# Patient Record
Sex: FEMALE | Race: WHITE | Employment: OTHER | URBAN - METROPOLITAN AREA
[De-identification: names, ages, dates, MRNs, and addresses within clinical notes are randomized per-mention and may not be internally consistent; named-entity substitution may affect disease eponyms.]

---

## 2017-02-15 RX ORDER — METOPROLOL TARTRATE 25 MG/1
TABLET, FILM COATED ORAL
Qty: 180 TAB | Refills: 0 | Status: SHIPPED | OUTPATIENT
Start: 2017-02-15 | End: 2017-05-08

## 2017-02-17 ENCOUNTER — HOSPITAL ENCOUNTER (OUTPATIENT)
Dept: MAMMOGRAPHY | Age: 72
Discharge: HOME | End: 2017-02-17
Attending: FAMILY MEDICINE
Payer: MEDICARE

## 2017-02-17 DIAGNOSIS — Z12.31 VISIT FOR SCREENING MAMMOGRAM: ICD-10-CM

## 2017-02-17 PROCEDURE — 77063 BREAST TOMOSYNTHESIS BI: CPT

## 2017-03-20 ENCOUNTER — TELEPHONE (OUTPATIENT)
Dept: CARDIOLOGY CLINIC | Age: 72
End: 2017-03-20

## 2017-03-20 RX ORDER — ASPIRIN 325 MG
TABLET, DELAYED RELEASE (ENTERIC COATED) ORAL
Qty: 90 TAB | Refills: 3 | Status: SHIPPED | OUTPATIENT
Start: 2017-03-20 | End: 2018-03-12 | Stop reason: SDUPTHER

## 2017-03-20 NOTE — TELEPHONE ENCOUNTER
Left message for patient to call to schedule overdue follow up and also let patient know that her refill has been sent to the pharmacy.

## 2017-04-18 ENCOUNTER — OFFICE VISIT (OUTPATIENT)
Dept: CARDIOLOGY CLINIC | Age: 72
End: 2017-04-18

## 2017-04-18 VITALS
SYSTOLIC BLOOD PRESSURE: 126 MMHG | HEART RATE: 89 BPM | BODY MASS INDEX: 59.07 KG/M2 | WEIGHT: 293 LBS | DIASTOLIC BLOOD PRESSURE: 69 MMHG | OXYGEN SATURATION: 98 % | HEIGHT: 59 IN

## 2017-04-18 DIAGNOSIS — I48.0 PAROXYSMAL ATRIAL FIBRILLATION (HCC): Primary | ICD-10-CM

## 2017-04-18 NOTE — PROGRESS NOTES
[facility]       Ms. Hershal Gottron is a 70year old woman with diabetes, hypertension and dyslipidemia. She has paroxysmal atrial fibrillation as noted on Holter monitor in July of 2014. Ms. Hershal Gottron is here today for follow up appointment. She used to be an established patient of Dr. Fritz. She is here today to establish care with me. She denies any new symptoms since last time. She is denying any awareness of palpitations. She denies any chest pain or chest tightness. She does have significant degenerative joint disease of the hip and spine and she has a lot of back pain and knee pain from that. Other than that she denies any specific PND or orthopnea. Past Medical History:   Diagnosis Date    Anxiety     Arthritis     Atrial fibrillation (HCC)     CHADS score 2  (-CHF, +HTN, -AGE, +DM, -CVA)    COPD     Degenerative spine disease     Diabetes ([address]     Dyslipidemia     Hypertension     Major depression     Menopause     Morbid [facility] ([facility]     Neuropathic pain     Sleep apnea        Past Surgical History:   Procedure Laterality Date    HX ABDOMINOPLASTY      HX HYSTERECTOMY         Current Outpatient Prescriptions   Medication Sig    aspirin delayed-release 325 mg tablet TAKE 1 TABLET BY MOUTH DAILY    metoprolol tartrate (LOPRESSOR) 25 mg tablet TAKE 1 TABLET BY MOUTH TWICE DAILY    SYMBICORT 160-4.5 mcg/actuation HFA inhaler     trospium (SANCTURA XL) 60 mg capsule Take 60 mg by mouth Daily (before breakfast).  fluticasone (FLOVENT DISKUS) 50 mcg/actuation inhaler Take  by inhalation.  nystatin-triamcinolone (MYCOLOG II) topical cream Apply  to affected area two (2) times a day.  albuterol (PROAIR HFA) 90 mcg/actuation inhaler Take  by inhalation.  Omega-3-DHA-EPA-Fish Oil 1,000 (120-180) mg cap Take  by mouth.  omeprazole (PRILOSEC) 20 mg capsule Take 20 mg by mouth daily.  losartan (COZAAR) 50 mg tablet Take 50 mg by mouth daily.     buPROPion SR (WELLBUTRIN, ZYBAN) 200 mg SR tablet Take 200 mg by mouth daily.  ergocalciferol (VITAMIN D2) 50,000 unit capsule Take 50,000 Units by mouth every seven (7) days. EVERY 2 WEEKS    sertraline (ZOLOFT) 100 mg tablet Take 100 mg by mouth two (2) times a day.  NIFEdipine ER (PROCARDIA XL) 60 mg ER tablet Take 60 mg by mouth daily.  tolterodine ER (DETROL LA) 4 mg ER capsule Take 4 mg by mouth daily. No current facility-administered medications for this visit. Allergies   Allergen Reactions    Sulfa (Sulfonamide Antibiotics) Hives       Family History:   Non contributory for premature coronary disease in first degree relatives. Social History:   She  reports that she has never smoked. She does not have any smokeless tobacco history on file. She  reports that she does not drink alcohol. Physical:   Vitals:   BP: 126/69  HR: 89  Wt: 295 lb (133.8 kg)    Exam:   GENERAL:  Ms. Court Woods is an older woman without complaints. CHEST:  Clear with good effort. CARDIAC:  Regular  ABDOMEN:  Soft. Bowel sounds present. EXTREMITIES:  No edema. Scattered ecchymoses. CAROTID:  No bruit.   NECK:  No JVD    Review of Data:   LABS:   Lab Results   Component Value Date/Time    WBC 13.5 06/16/2014 03:40 PM    HGB 12.9 06/16/2014 03:40 PM    HCT 40.1 06/16/2014 03:40 PM    PLATELET 337 12/28/5964 03:40 PM     Lab Results   Component Value Date/Time    Sodium 139 06/16/2014 03:40 PM    Potassium 4.7 06/16/2014 03:40 PM    Chloride 107 06/16/2014 03:40 PM    CO2 23 06/16/2014 03:40 PM    Glucose 133 06/16/2014 03:40 PM    BUN 24 06/16/2014 03:40 PM    Creatinine 1.12 06/16/2014 03:40 PM     Lab Results   Component Value Date/Time    Cholesterol, total 176 08/17/2010 02:30 PM    HDL Cholesterol 33 08/17/2010 02:30 PM    LDL, calculated 83.6 08/17/2010 02:30 PM    Triglyceride 297 08/17/2010 02:30 PM    CHOL/HDL Ratio 5.3 08/17/2010 02:30 PM     No results found for: GPT  No results found for: HBA1C, [id number]  EKG: July 2014:  double voltage standard. Sinus tachycardia 121 beats per minute. Nonspecific interventricular conduction delay. Nonspecific ST-T wave changes. HOLTER: July 2014:  Rhythm was sinus with intermittent short run of atrial fibrillation. Atrial fibrillation was present for 0.31% of the recording time. The average heart rate, excluding ectopy, was 77 beats per minute, with a minimum heart rate of 55 beats per minute at 8:42 a.m., and a peak heart rate of 126 beats per minute at 5:59 p.m. Heartbeats, including ectopy, totalled 183,947 beats. Ventricular ectopy:  None identified. Supraventricular ectopy:  250 total, averaging 6.5 per hour, with 216 single events and 34 paired beats. Interpretation:  1. Rhythm is sinus with intermittent brief run of atrial fibrillation. Patient was in atrial fibrillation for 0.31% of the recording time. 2. AK and QRS are within normal limits. 3. 216 PACs, 34 paired events. ECHO: July 2014:  LEFT VENTRICLE: Size was normal. Systolic function was at the lower limits of normal by visual assessment. Ejection fraction was estimated in the range of 50-55%. No obvious wall motion abnormalities identified in the views obtained. Wall thickness was mildly increased. DOPPLER: Doppler parameters were consistent with abnormal left ventricular relaxation (grade 1 diastolic dysfunction). RIGHT VENTRICLE: The size was normal. Systolic function was normal. Wall thickness was normal. DOPPLER: The tricuspid jet envelope definition was inadequate for estimation of RV systolic pressure. There are no indirect findings (abnormal RV volume or geometry, altered pulmonary flow velocity profile, or leftward septal displacement) which would suggest moderate or severe pulmonary hypertension. LEFT ATRIUM: Size was normal. LA volume index was 12 ml/m squared. RIGHT ATRIUM: Size was normal.    MITRAL VALVE: There was mild annular calcification.  There was minimal thickening. DOPPLER: There was no evidence for stenosis. There was mild regurgitation. AORTIC VALVE: The valve was trileaflet. Leaflets exhibited mildly increased thickness and mild calcification. DOPPLER: There was no stenosis. There was no significant regurgitation. TRICUSPID VALVE: There was normal leaflet separation. DOPPLER: There was no evidence for tricuspid stenosis. There was trivial regurgitation. PULMONIC VALVE: Not well visualized, but normal Doppler findings. AORTA: The root exhibited normal size. PERICARDIUM: No significant pericardial effusion identified. A pericardial fat pad was present. Impression / Plan:     Diabetes    Hypertension    Dyslipidemia    Atrial fibrillation     Paroxysmal atrial fibrillation: This diagnosis was made on a Holter monitor performed in July, 2014. All electrocardiograms since then have shown sinus rhythm. On exam she appears to be in sinus rhythm. Historically she denies any awareness of any palpitations or any arrhythmia. She is on AV rima blocking agent for rate control. She is also on Metoprolol 25 mg twice daily. She is on aspirin for stroke prophylaxis. We had a lengthy discussion today regarding her CHADS2 score, as well as CHADS2-vasc score. Risk of stroke based on her CHADS score was discussed. After a lengthy discussion she has decided to continue with aspirin only. She'd like to think about anticoagulation with Coumadin or newer agent and she would let us know in the future. For now she'd like to continue only with aspirin despite knowing her CHADS score and risk of stroke. Hypertension:  Her blood pressure today is 126/69 mmHg. She's on Lopressor, Losartan and Nifedipine, which I recommend to continue. Diabetes: This is being managed by primary care provider. She is not on any oral agent at this time. Goal hemoglobin A1c less than 7 is recommended from cardiovascular standpoint.   This is being managed by  [facility].    Hyperlipidemia:  Currently she is taking omega 3 fatty acid. She is not on any statin medication. This is being managed by primary care provider. I would recommend to continue with same medication for now. Other than the above, or unless any additional issues arise, I have asked that she follow up in 9-12 months.

## 2017-04-18 NOTE — LETTER
Patient:  Amarjit Morgan YOB: 1945 Date of Visit: 4/18/2017 Dear Duy Sanches MD 
[id number] [facility] [id number] VIA Facsimile: [phone] 
 : 
 
 
     [facility] Ms. Everardo Hopkins is a 70year old woman with diabetes, hypertension and dyslipidemia. She has paroxysmal atrial fibrillation as noted on Holter monitor in July of 2014. Ms. Everardo Hopkins is here today for follow up appointment. She used to be an established patient of Dr. Kim Wright. She is here today to establish care with me. She denies any new symptoms since last time. She is denying any awareness of palpitations. She denies any chest pain or chest tightness. She does have significant degenerative joint disease of the hip and spine and she has a lot of back pain and knee pain from that. Other than that she denies any specific PND or orthopnea. Past Medical History:  
Diagnosis Date  Anxiety  Arthritis  Atrial fibrillation ([address] CHADS score 2  (-CHF, +HTN, -AGE, +DM, -CVA)  COPD  Degenerative spine disease  Diabetes ([address]  Dyslipidemia  Hypertension  Major depression  Menopause  Morbid obesity ([address]  Neuropathic pain  Sleep apnea Past Surgical History:  
Procedure Laterality Date  HX ABDOMINOPLASTY  HX HYSTERECTOMY Current Outpatient Prescriptions Medication Sig  
 aspirin delayed-release 325 mg tablet TAKE 1 TABLET BY MOUTH DAILY  metoprolol tartrate (LOPRESSOR) 25 mg tablet TAKE 1 TABLET BY MOUTH TWICE DAILY  SYMBICORT 160-4.5 mcg/actuation HFA inhaler  trospium (SANCTURA XL) 60 mg capsule Take 60 mg by mouth Daily (before breakfast).  fluticasone (FLOVENT DISKUS) 50 mcg/actuation inhaler Take  by inhalation.  nystatin-triamcinolone (MYCOLOG II) topical cream Apply  to affected area two (2) times a day.  albuterol (PROAIR HFA) 90 mcg/actuation inhaler Take  by inhalation.  Omega-3-DHA-EPA-Fish Oil 1,000 (120-180) mg cap Take  by mouth.  omeprazole (PRILOSEC) 20 mg capsule Take 20 mg by mouth daily.  losartan (COZAAR) 50 mg tablet Take 50 mg by mouth daily.  buPROPion SR (WELLBUTRIN, ZYBAN) 200 mg SR tablet Take 200 mg by mouth daily.  ergocalciferol (VITAMIN D2) 50,000 unit capsule Take 50,000 Units by mouth every seven (7) days. EVERY 2 WEEKS  sertraline (ZOLOFT) 100 mg tablet Take 100 mg by mouth two (2) times a day.  NIFEdipine ER (PROCARDIA XL) 60 mg ER tablet Take 60 mg by mouth daily.  tolterodine ER (DETROL LA) 4 mg ER capsule Take 4 mg by mouth daily. No current facility-administered medications for this visit. Allergies Allergen Reactions  Sulfa (Sulfonamide Antibiotics) Hives Family History:  
Non contributory for premature coronary disease in first degree relatives. Social History: She  reports that she has never smoked. She does not have any smokeless tobacco history on file. She  reports that she does not drink alcohol. Physical:  
Vitals:  
BP: 126/69 HR: 89 Wt: 295 lb (133.8 kg) Exam:  
GENERAL:  Ms. Yary Booth is an older woman without complaints. CHEST:  Clear with good effort. CARDIAC:  Regular ABDOMEN:  Soft. Bowel sounds present. EXTREMITIES:  No edema. Scattered ecchymoses. CAROTID:  No bruit. NECK:  No JVD Review of Data:  
LABS:  
Lab Results Component Value Date/Time WBC 13.5 06/16/2014 03:40 PM  
 HGB 12.9 06/16/2014 03:40 PM  
 HCT 40.1 06/16/2014 03:40 PM  
 PLATELET 330 25/92/2011 03:40 PM  
 
Lab Results Component Value Date/Time Sodium 139 06/16/2014 03:40 PM  
 Potassium 4.7 06/16/2014 03:40 PM  
 Chloride 107 06/16/2014 03:40 PM  
 CO2 23 06/16/2014 03:40 PM  
 Glucose 133 06/16/2014 03:40 PM  
 BUN 24 06/16/2014 03:40 PM  
 Creatinine 1.12 06/16/2014 03:40 PM  
 
Lab Results Component Value Date/Time  Cholesterol, total 176 08/17/2010 02:30 PM  
 HDL Cholesterol 33 08/17/2010 02:30 PM  
 LDL, calculated 83.6 08/17/2010 02:30 PM  
 Triglyceride 297 08/17/2010 02:30 PM  
 CHOL/HDL Ratio 5.3 08/17/2010 02:30 PM  
 
No results found for: GPT No results found for: HBA1C, [id number] 
EKG: July 2014: 
double voltage standard. Sinus tachycardia 121 beats per minute. Nonspecific interventricular conduction delay. Nonspecific ST-T wave changes. HOLTER: July 2014: 
Rhythm was sinus with intermittent short run of atrial fibrillation. Atrial fibrillation was present for 0.31% of the recording time. The average heart rate, excluding ectopy, was 77 beats per minute, with a minimum heart rate of 55 beats per minute at 8:42 a.m., and a peak heart rate of 126 beats per minute at 5:59 p.m. Heartbeats, including ectopy, totalled 183,947 beats. Ventricular ectopy:  None identified. Supraventricular ectopy:  250 total, averaging 6.5 per hour, with 216 single events and 34 paired beats. Interpretation: 1. Rhythm is sinus with intermittent brief run of atrial fibrillation. Patient was in atrial fibrillation for 0.31% of the recording time. 2. SD and QRS are within normal limits. 3. 216 PACs, 34 paired events. ECHO: July 2014: 
LEFT VENTRICLE: Size was normal. Systolic function was at the lower limits of normal by visual assessment. Ejection fraction was estimated in the range of 50-55%. No obvious wall motion abnormalities identified in the views obtained. Wall thickness was mildly increased. DOPPLER: Doppler parameters were consistent with abnormal left ventricular relaxation (grade 1 diastolic dysfunction). RIGHT VENTRICLE: The size was normal. Systolic function was normal. Wall thickness was normal. DOPPLER: The tricuspid jet envelope definition was inadequate for estimation of RV systolic pressure.  There are no indirect findings (abnormal RV volume or geometry, altered pulmonary flow velocity profile, or leftward septal displacement) which would suggest moderate or severe pulmonary hypertension. LEFT ATRIUM: Size was normal. LA volume index was 12 ml/m squared. RIGHT ATRIUM: Size was normal. 
 
MITRAL VALVE: There was mild annular calcification. There was minimal thickening. DOPPLER: There was no evidence for stenosis. There was mild regurgitation. AORTIC VALVE: The valve was trileaflet. Leaflets exhibited mildly increased thickness and mild calcification. DOPPLER: There was no stenosis. There was no significant regurgitation. TRICUSPID VALVE: There was normal leaflet separation. DOPPLER: There was no evidence for tricuspid stenosis. There was trivial regurgitation. PULMONIC VALVE: Not well visualized, but normal Doppler findings. AORTA: The root exhibited normal size. PERICARDIUM: No significant pericardial effusion identified. A pericardial fat pad was present. Impression / Plan:  Diabetes  Hypertension  Dyslipidemia  Atrial fibrillation Paroxysmal atrial fibrillation: This diagnosis was made on a Holter monitor performed in July, 2014. All electrocardiograms since then have shown sinus rhythm. On exam she appears to be in sinus rhythm. Historically she denies any awareness of any palpitations or any arrhythmia. She is on AV rima blocking agent for rate control. She is also on Metoprolol 25 mg twice daily. She is on aspirin for stroke prophylaxis. We had a lengthy discussion today regarding her CHADS2 score, as well as CHADS2-vasc score. Risk of stroke based on her CHADS score was discussed. After a lengthy discussion she has decided to continue with aspirin only. She'd like to think about anticoagulation with Coumadin or newer agent and she would let us know in the future. For now she'd like to continue only with aspirin despite knowing her CHADS score and risk of stroke. Hypertension:  Her blood pressure today is 126/69 mmHg.   She's on Lopressor, Losartan and Nifedipine, which I recommend to continue. Diabetes: This is being managed by primary care provider. She is not on any oral agent at this time. Goal hemoglobin A1c less than 7 is recommended from cardiovascular standpoint. This is being managed by Dr. Estiven Oakes. Hyperlipidemia:  Currently she is taking omega 3 fatty acid. She is not on any statin medication. This is being managed by primary care provider. I would recommend to continue with same medication for now. Other than the above, or unless any additional issues arise, I have asked that she follow up in 9-12 months. Sincerely, Aquiles Guy MD

## 2017-04-18 NOTE — MR AVS SNAPSHOT
Visit Information Date & Time Provider Department Dept. Phone Encounter #  
 4/18/2017  9:15 AM Oxana Roy MD [address] [place] [address] Specialist at [facility] [phone] [id number] Follow-up Instructions Return in about 1 year (around 4/18/2018). Upcoming Health Maintenance Date Due HEMOGLOBIN A1C Q6M 1945 FOOT EXAM Q1 11/1/1954 MICROALBUMIN Q1 11/1/1954 EYE EXAM RETINAL OR DILATED Q1 11/1/1954 DTaP/Tdap/Td series (1 - Tdap) 11/1/1965 FOBT Q 1 YEAR AGE 50-75 11/1/1994 ZOSTER VACCINE AGE 60> 11/1/2004 GLAUCOMA SCREENING Q2Y 11/1/2009 Pneumococcal 65+ Low/Medium Risk (1 of 2 - PCV13) 11/1/2009 MEDICARE YEARLY EXAM 11/1/2009 LIPID PANEL Q1 8/17/2011 INFLUENZA AGE 9 TO ADULT 8/1/2016 BREAST CANCER SCRN MAMMOGRAM 2/17/2019 Allergies as of 4/18/2017  Review Complete On: 2/17/2017 By: Sheridan Spicer Severity Noted Reaction Type Reactions Sulfa (Sulfonamide Antibiotics)  05/14/2013    Hives Current Immunizations  Never Reviewed No immunizations on file. Not reviewed this visit You Were Diagnosed With   
  
 Codes Comments Paroxysmal atrial fibrillation (HCC)    -  Primary ICD-10-CM: I48.0 ICD-9-CM: 427.31 Vitals BP Pulse Height(growth percentile) Weight(growth percentile) SpO2 BMI  
 126/69 89 4' 11\" (1.499 m) 295 lb (133.8 kg) 98% 59.58 kg/m2 OB Status Smoking Status Hysterectomy Never Smoker BMI and BSA Data Body Mass Index Body Surface Area 59.58 kg/m 2 2.36 m 2 Preferred Pharmacy Pharmacy Name Phone [facility] [place], [address] [place] [address] [phone] Your Updated Medication List  
  
   
This list is accurate as of: 4/18/17  9:46 AM.  Always use your most recent med list.  
  
  
  
  
 aspirin delayed-release 325 mg tablet TAKE 1 TABLET BY MOUTH DAILY buPROPion  mg SR tablet Commonly known as:  Ivan Gola Take 200 mg by mouth daily. fluticasone 50 mcg/actuation inhaler Commonly known as:  FLOVENT DISKUS Take  by inhalation. losartan 50 mg tablet Commonly known as:  COZAAR Take 50 mg by mouth daily. metoprolol tartrate 25 mg tablet Commonly known as:  LOPRESSOR  
TAKE 1 TABLET BY MOUTH TWICE DAILY  
  
 NIFEdipine ER 60 mg ER tablet Commonly known as:  PROCARDIA XL Take 60 mg by mouth daily. nystatin-triamcinolone topical cream  
Commonly known as:  MYCOLOG II Apply  to affected area two (2) times a day. Omega-3-DHA-EPA-Fish Oil 1,000 mg (120 mg-180 mg) Cap Take  by mouth. omeprazole 20 mg capsule Commonly known as:  PRILOSEC Take 20 mg by mouth daily. PROAIR HFA 90 mcg/actuation inhaler Generic drug:  albuterol Take  by inhalation. sertraline 100 mg tablet Commonly known as:  ZOLOFT Take 100 mg by mouth two (2) times a day. SYMBICORT 160-4.5 mcg/actuation HFA inhaler Generic drug:  budesonide-formoterol  
  
 tolterodine ER 4 mg ER capsule Commonly known as:  Lisset Gunnar Take 4 mg by mouth daily. trospium 60 mg capsule Commonly known as:  SANCTURA XL Take 60 mg by mouth Daily (before breakfast). VITAMIN D2 50,000 unit capsule Generic drug:  ergocalciferol Take 50,000 Units by mouth every seven (7) days. EVERY 2 WEEKS We Performed the Following AMB POC EKG ROUTINE W/ 12 LEADS, INTER & REP [55400 CPT(R)] Follow-up Instructions Return in about 1 year (around 4/18/2018). Introducing [facility]! Dear Gadiel Walsh: Thank you for requesting a [employer] account. Our records indicate that you have previously registered for a [employer] account but its currently inactive. Please call our [employer] support line at [phone]. Additional Information If you have questions, please visit the Frequently Asked Questions section of the [facility] website at [email or web address] Remember, [facility] is NOT to be used for urgent needs. For medical emergencies, dial 911. Now available from your iPhone and Android! Please provide this summary of care documentation to your next provider. Your primary care clinician is listed as Dillon Hernandez. If you have any questions after today's visit, please call [phone].

## 2017-04-28 NOTE — COMMUNICATION BODY
[facility]       Ms. Alethea Gallardo is a 70year old woman with diabetes, hypertension and dyslipidemia. She has paroxysmal atrial fibrillation as noted on Holter monitor in July of 2014. Ms. Alethea Gallardo is here today for follow up appointment. She used to be an established patient of Dr. Shefali Beaver. She is here today to establish care with me. She denies any new symptoms since last time. She is denying any awareness of palpitations. She denies any chest pain or chest tightness. She does have significant degenerative joint disease of the hip and spine and she has a lot of back pain and knee pain from that. Other than that she denies any specific PND or orthopnea. Past Medical History:   Diagnosis Date    Anxiety     Arthritis     Atrial fibrillation (HCC)     CHADS score 2  (-CHF, +HTN, -AGE, +DM, -CVA)    COPD     Degenerative spine disease     Diabetes ([facility] [address]     Dyslipidemia     Hypertension     Major depression     Menopause     Morbid obesity ([facility] [address]     Neuropathic pain     Sleep apnea        Past Surgical History:   Procedure Laterality Date    HX ABDOMINOPLASTY      HX HYSTERECTOMY         Current Outpatient Prescriptions   Medication Sig    aspirin delayed-release 325 mg tablet TAKE 1 TABLET BY MOUTH DAILY    metoprolol tartrate (LOPRESSOR) 25 mg tablet TAKE 1 TABLET BY MOUTH TWICE DAILY    SYMBICORT 160-4.5 mcg/actuation HFA inhaler     trospium (SANCTURA XL) 60 mg capsule Take 60 mg by mouth Daily (before breakfast).  fluticasone (FLOVENT DISKUS) 50 mcg/actuation inhaler Take  by inhalation.  nystatin-triamcinolone (MYCOLOG II) topical cream Apply  to affected area two (2) times a day.  albuterol (PROAIR HFA) 90 mcg/actuation inhaler Take  by inhalation.  Omega-3-DHA-EPA-Fish Oil 1,000 (120-180) mg cap Take  by mouth.  omeprazole (PRILOSEC) 20 mg capsule Take 20 mg by mouth daily.  losartan (COZAAR) 50 mg tablet Take 50 mg by mouth daily.     buPROPion SR (WELLBUTRIN, ZYBAN) 200 mg SR tablet Take 200 mg by mouth daily.  ergocalciferol (VITAMIN D2) 50,000 unit capsule Take 50,000 Units by mouth every seven (7) days. EVERY 2 WEEKS    sertraline (ZOLOFT) 100 mg tablet Take 100 mg by mouth two (2) times a day.  NIFEdipine ER (PROCARDIA XL) 60 mg ER tablet Take 60 mg by mouth daily.  tolterodine ER (DETROL LA) 4 mg ER capsule Take 4 mg by mouth daily. No current facility-administered medications for this visit. Allergies   Allergen Reactions    Sulfa (Sulfonamide Antibiotics) Hives       Family History:   Non contributory for premature coronary disease in first degree relatives. Social History:   She  reports that she has never smoked. She does not have any smokeless tobacco history on file. She  reports that she does not drink alcohol. Physical:   Vitals:   BP: 126/69  HR: 89  Wt: 295 lb (133.8 kg)    Exam:   GENERAL:  Ms. Everardo Hopkins is an older woman without complaints. CHEST:  Clear with good effort. CARDIAC:  Regular  ABDOMEN:  Soft. Bowel sounds present. EXTREMITIES:  No edema. Scattered ecchymoses. CAROTID:  No bruit.   NECK:  No JVD    Review of Data:   LABS:   Lab Results   Component Value Date/Time    WBC 13.5 06/16/2014 03:40 PM    HGB 12.9 06/16/2014 03:40 PM    HCT 40.1 06/16/2014 03:40 PM    PLATELET 836 96/41/9367 03:40 PM     Lab Results   Component Value Date/Time    Sodium 139 06/16/2014 03:40 PM    Potassium 4.7 06/16/2014 03:40 PM    Chloride 107 06/16/2014 03:40 PM    CO2 23 06/16/2014 03:40 PM    Glucose 133 06/16/2014 03:40 PM    BUN 24 06/16/2014 03:40 PM    Creatinine 1.12 06/16/2014 03:40 PM     Lab Results   Component Value Date/Time    Cholesterol, total 176 08/17/2010 02:30 PM    HDL Cholesterol 33 08/17/2010 02:30 PM    LDL, calculated 83.6 08/17/2010 02:30 PM    Triglyceride 297 08/17/2010 02:30 PM    CHOL/HDL Ratio 5.3 08/17/2010 02:30 PM     No results found for: GPT  No results found for: HBA1C, [id number]  EKG: July 2014:  double voltage standard. Sinus tachycardia 121 beats per minute. Nonspecific interventricular conduction delay. Nonspecific ST-T wave changes. HOLTER: July 2014:  Rhythm was sinus with intermittent short run of atrial fibrillation. Atrial fibrillation was present for 0.31% of the recording time. The average heart rate, excluding ectopy, was 77 beats per minute, with a minimum heart rate of 55 beats per minute at 8:42 a.m., and a peak heart rate of 126 beats per minute at 5:59 p.m. Heartbeats, including ectopy, totalled 183,947 beats. Ventricular ectopy:  None identified. Supraventricular ectopy:  250 total, averaging 6.5 per hour, with 216 single events and 34 paired beats. Interpretation:  1. Rhythm is sinus with intermittent brief run of atrial fibrillation. Patient was in atrial fibrillation for 0.31% of the recording time. 2. SC and QRS are within normal limits. 3. 216 PACs, 34 paired events. ECHO: July 2014:  LEFT VENTRICLE: Size was normal. Systolic function was at the lower limits of normal by visual assessment. Ejection fraction was estimated in the range of 50-55%. No obvious wall motion abnormalities identified in the views obtained. Wall thickness was mildly increased. DOPPLER: Doppler parameters were consistent with abnormal left ventricular relaxation (grade 1 diastolic dysfunction). RIGHT VENTRICLE: The size was normal. Systolic function was normal. Wall thickness was normal. DOPPLER: The tricuspid jet envelope definition was inadequate for estimation of RV systolic pressure. There are no indirect findings (abnormal RV volume or geometry, altered pulmonary flow velocity profile, or leftward septal displacement) which would suggest moderate or severe pulmonary hypertension. LEFT ATRIUM: Size was normal. LA volume index was 12 ml/m squared. RIGHT ATRIUM: Size was normal.    MITRAL VALVE: There was mild annular calcification.  There was minimal thickening. DOPPLER: There was no evidence for stenosis. There was mild regurgitation. AORTIC VALVE: The valve was trileaflet. Leaflets exhibited mildly increased thickness and mild calcification. DOPPLER: There was no stenosis. There was no significant regurgitation. TRICUSPID VALVE: There was normal leaflet separation. DOPPLER: There was no evidence for tricuspid stenosis. There was trivial regurgitation. PULMONIC VALVE: Not well visualized, but normal Doppler findings. AORTA: The root exhibited normal size. PERICARDIUM: No significant pericardial effusion identified. A pericardial fat pad was present. Impression / Plan:     Diabetes    Hypertension    Dyslipidemia    Atrial fibrillation     Paroxysmal atrial fibrillation: This diagnosis was made on a Holter monitor performed in July, 2014. All electrocardiograms since then have shown sinus rhythm. On exam she appears to be in sinus rhythm. Historically she denies any awareness of any palpitations or any arrhythmia. She is on AV rima blocking agent for rate control. She is also on Metoprolol 25 mg twice daily. She is on aspirin for stroke prophylaxis. We had a lengthy discussion today regarding her CHADS2 score, as well as CHADS2-vasc score. Risk of stroke based on her CHADS score was discussed. After a lengthy discussion she has decided to continue with aspirin only. She'd like to think about anticoagulation with Coumadin or newer agent and she would let us know in the future. For now she'd like to continue only with aspirin despite knowing her CHADS score and risk of stroke. Hypertension:  Her blood pressure today is 126/69 mmHg. She's on Lopressor, Losartan and Nifedipine, which I recommend to continue. Diabetes: This is being managed by primary care provider. She is not on any oral agent at this time. Goal hemoglobin A1c less than 7 is recommended from cardiovascular standpoint.   This is being managed by  [facility].    Hyperlipidemia:  Currently she is taking omega 3 fatty acid. She is not on any statin medication. This is being managed by primary care provider. I would recommend to continue with same medication for now. Other than the above, or unless any additional issues arise, I have asked that she follow up in 9-12 months.

## 2017-05-08 ENCOUNTER — APPOINTMENT (OUTPATIENT)
Dept: GENERAL RADIOLOGY | Age: 72
End: 2017-05-08
Attending: EMERGENCY MEDICINE
Payer: MEDICARE

## 2017-05-08 ENCOUNTER — HOSPITAL ENCOUNTER (EMERGENCY)
Age: 72
Discharge: HOME | End: 2017-05-08
Attending: EMERGENCY MEDICINE | Admitting: EMERGENCY MEDICINE
Payer: MEDICARE

## 2017-05-08 VITALS
HEART RATE: 80 BPM | WEIGHT: 282 LBS | HEIGHT: 59 IN | OXYGEN SATURATION: 96 % | RESPIRATION RATE: 19 BRPM | SYSTOLIC BLOOD PRESSURE: 147 MMHG | DIASTOLIC BLOOD PRESSURE: 71 MMHG | TEMPERATURE: 99.5 F | BODY MASS INDEX: 56.85 KG/M2

## 2017-05-08 DIAGNOSIS — R55 NEAR SYNCOPE: Primary | ICD-10-CM

## 2017-05-08 DIAGNOSIS — R00.1 BRADYCARDIA: ICD-10-CM

## 2017-05-08 LAB
ALBUMIN SERPL BCP-MCNC: 3.5 G/DL (ref 3.4–5)
ALBUMIN/GLOB SERPL: 1.3 {RATIO} (ref 0.8–1.7)
ALP SERPL-CCNC: 50 U/L (ref 45–117)
ALT SERPL-CCNC: 32 U/L (ref 13–56)
ANION GAP BLD CALC-SCNC: 12 MMOL/L (ref 3–18)
AST SERPL W P-5'-P-CCNC: 43 U/L (ref 15–37)
BASOPHILS # BLD AUTO: 0 K/UL (ref 0–0.06)
BASOPHILS # BLD: 1 % (ref 0–2)
BILIRUB SERPL-MCNC: 0.4 MG/DL (ref 0.2–1)
BUN SERPL-MCNC: 24 MG/DL (ref 7–18)
BUN/CREAT SERPL: 26 (ref 12–20)
CALCIUM SERPL-MCNC: 9.3 MG/DL (ref 8.5–10.1)
CHLORIDE SERPL-SCNC: 104 MMOL/L (ref 100–108)
CK MB CFR SERPL CALC: 3.1 % (ref 0–4)
CK MB SERPL-MCNC: 1.1 NG/ML (ref 5–25)
CK SERPL-CCNC: 35 U/L (ref 26–192)
CO2 SERPL-SCNC: 25 MMOL/L (ref 21–32)
CREAT SERPL-MCNC: 0.91 MG/DL (ref 0.6–1.3)
DIFFERENTIAL METHOD BLD: ABNORMAL
EOSINOPHIL # BLD: 0.3 K/UL (ref 0–0.4)
EOSINOPHIL NFR BLD: 5 % (ref 0–5)
ERYTHROCYTE [DISTWIDTH] IN BLOOD BY AUTOMATED COUNT: 15.6 % (ref 11.6–14.5)
GLOBULIN SER CALC-MCNC: 2.6 G/DL (ref 2–4)
GLUCOSE SERPL-MCNC: 118 MG/DL (ref 74–99)
HCT VFR BLD AUTO: 38.3 % (ref 35–45)
HGB BLD-MCNC: 12.3 G/DL (ref 12–16)
LYMPHOCYTES # BLD AUTO: 27 % (ref 21–52)
LYMPHOCYTES # BLD: 1.8 K/UL (ref 0.9–3.6)
MCH RBC QN AUTO: 30.9 PG (ref 24–34)
MCHC RBC AUTO-ENTMCNC: 32.1 G/DL (ref 31–37)
MCV RBC AUTO: 96.2 FL (ref 74–97)
MONOCYTES # BLD: 0.4 K/UL (ref 0.05–1.2)
MONOCYTES NFR BLD AUTO: 6 % (ref 3–10)
NEUTS SEG # BLD: 4.2 K/UL (ref 1.8–8)
NEUTS SEG NFR BLD AUTO: 61 % (ref 40–73)
PLATELET # BLD AUTO: 121 K/UL (ref 135–420)
PMV BLD AUTO: 9.8 FL (ref 9.2–11.8)
POTASSIUM SERPL-SCNC: 4.5 MMOL/L (ref 3.5–5.5)
PROT SERPL-MCNC: 6.1 G/DL (ref 6.4–8.2)
RBC # BLD AUTO: 3.98 M/UL (ref 4.2–5.3)
SODIUM SERPL-SCNC: 141 MMOL/L (ref 136–145)
TROPONIN I BLD-MCNC: <0.04 NG/ML (ref 0–0.08)
TROPONIN I SERPL-MCNC: <0.02 NG/ML (ref 0–0.04)
WBC # BLD AUTO: 6.8 K/UL (ref 4.6–13.2)

## 2017-05-08 PROCEDURE — 71010 XR CHEST PORT: CPT

## 2017-05-08 PROCEDURE — 85025 COMPLETE CBC W/AUTO DIFF WBC: CPT | Performed by: EMERGENCY MEDICINE

## 2017-05-08 PROCEDURE — 82550 ASSAY OF CK (CPK): CPT | Performed by: EMERGENCY MEDICINE

## 2017-05-08 PROCEDURE — 80053 COMPREHEN METABOLIC PANEL: CPT | Performed by: EMERGENCY MEDICINE

## 2017-05-08 PROCEDURE — 99285 EMERGENCY DEPT VISIT HI MDM: CPT

## 2017-05-08 PROCEDURE — 93005 ELECTROCARDIOGRAM TRACING: CPT

## 2017-05-08 PROCEDURE — 84484 ASSAY OF TROPONIN QUANT: CPT | Performed by: EMERGENCY MEDICINE

## 2017-05-08 NOTE — ED NOTES
Purposeful rounding completed:    Side rails up x 1:  YES   Bed low and wheels and locked: YES   Call bell in reach: YES   Comfort addressed: YES     Toileting needs addressed: YES   Plan of care reviewed/updated with patient and or family members: YES   IV site assessed: YES  Pain assessed and addressed: Monique Ramos

## 2017-05-08 NOTE — ED NOTES
Purposeful rounding completed:    Side rails up x 1:  YES   Bed low and wheels and locked: YES   Call bell in reach: YES   Comfort addressed: YES     Toileting needs addressed: YES   Plan of care reviewed/updated with patient and or family members: YES   IV site assessed: YES  Pain assessed and addressed: Alcus Fabry

## 2017-05-08 NOTE — ED NOTES
Purposeful rounding completed:    Side rails up x 1:  YES   Bed low and wheels and locked: YES   Call bell in reach: YES   Comfort addressed: YES     Toileting needs addressed: YES   Plan of care reviewed/updated with patient and or family members: YES   IV site assessed: YES  Pain assessed and addressed: Judit Herndon

## 2017-05-08 NOTE — ED PROVIDER NOTES
HPI Comments: 1:38 PM Fransisco Barksdale is a 66 y.o. female with history of DM, COPD, and HTN who presents to the ED c/o SOB which begins with episodes of multiple symptoms. Pt complains of the episode containing associated bradycardia, light-headedness, dizziness, and arm \"fluttering. \" Pt states the first episode happened 3 years ago and has now happened a few times in the past 2-3 weeks. Pt states that she has taken aspirin last night. Pt states she has seen Dr. Kandice Linares and he states that she needed to be placed on a pacemaker or Coumadin, but has not been yet. Pt denies head trauma. Pt denies any syncope, or any other symptoms at this time. PCP: Jesse Cline MD      The history is provided by the patient. Past Medical History:   Diagnosis Date    Anxiety     Atrial fibrillation (HCC)     CHADS score 2  (-CHF, +HTN, -AGE, +DM, -CVA)    COPD     Degenerative spine disease     Diabetes ([address]     Dyslipidemia     Hypertension     Major depression     Morbid obesity ([facility] [address]     Neuropathic pain     Sleep apnea        Past Surgical History:   Procedure Laterality Date    HX ABDOMINOPLASTY      HX HYSTERECTOMY           History reviewed. No pertinent family history. Social History     Social History    Marital status:      Spouse name: N/A    Number of children: N/A    Years of education: N/A     Occupational History    Not on file. Social History Main Topics    Smoking status: Never Smoker    Smokeless tobacco: Not on file    Alcohol use No    Drug use: No    Sexual activity: Not on file     Other Topics Concern    Not on file     Social History Narrative         ALLERGIES: Sulfa (sulfonamide antibiotics)    Review of Systems   Constitutional: Negative for fever. HENT: Negative for congestion. Respiratory: Positive for shortness of breath. Negative for cough. Cardiovascular: Negative for chest pain and leg swelling.    Gastrointestinal: Negative for abdominal pain, nausea and vomiting. Genitourinary: Negative for dysuria. Musculoskeletal: Negative. Neurological: Positive for dizziness and light-headedness. Negative for speech difficulty and headaches. All other systems reviewed and are negative. Vitals:    05/08/17 1415 05/08/17 1430 05/08/17 1445 05/08/17 1500   BP: 119/87 (!) 142/101 134/66 143/66   Pulse: 78 79 73 80   Resp: 23 20 20 21   Temp:       SpO2: 95% 95% 94% 96%   Weight:       Height:                Physical Exam   Constitutional: She is oriented to person, place, and time. She appears well-developed and well-nourished. No distress. Obesity   HENT:   Head: Normocephalic and atraumatic. Mouth/Throat: Oropharynx is clear and moist.   Eyes: Conjunctivae and EOM are normal. Pupils are equal, round, and reactive to light. No scleral icterus. Neck: Normal range of motion. Neck supple. Cardiovascular: Normal rate, regular rhythm and normal heart sounds. No murmur heard. Pulmonary/Chest: Effort normal and breath sounds normal. No respiratory distress. Abdominal: Soft. Bowel sounds are normal. She exhibits no distension. There is no tenderness. Musculoskeletal: She exhibits no edema. Lymphadenopathy:     She has no cervical adenopathy. Neurological: She is alert and oriented to person, place, and time. Coordination normal.   Skin: Skin is warm and dry. No rash noted. Psychiatric: She has a normal mood and affect. Her behavior is normal.   Nursing note and vitals reviewed.        MDM  Number of Diagnoses or Management Options  Bradycardia:   Near syncope:   Diagnosis management comments: Near syncope at PCP's office with documented bradycardia now back to baseline h /o paroxysmal a fib on asa only per pt's choice with beta blocker    Ekg: in ED  nsr rate 79 no acute st changes QTc 454    cxr neg in ED     2 sets of negative cardiac enzymes in Ed will dc metoprolol and outpt follow up          Amount and/or Complexity of Data Reviewed  Clinical lab tests: ordered and reviewed  Tests in the radiology section of CPT®: ordered and reviewed      ED Course       Procedures  Vitals:  Patient Vitals for the past 12 hrs:   Temp Pulse Resp BP SpO2   05/08/17 1500 - 80 21 143/66 96 %   05/08/17 1445 - 73 20 134/66 94 %   05/08/17 1430 - 79 20 (!) 142/101 95 %   05/08/17 1415 - 78 23 119/87 95 %   05/08/17 1400 - 80 19 140/75 95 %   05/08/17 1345 - 80 22 139/82 95 %   05/08/17 1330 - 79 21 135/72 94 %   05/08/17 1319 99.5 °F (37.5 °C) 81 18 143/77 94 %   05/08/17 1315 - - - 143/77 94 %   05/08/17 1312 - - - 151/70 -        Medications ordered:   Medications - No data to display      Lab findings:  Recent Results (from the past 12 hour(s))   EKG, 12 LEAD, INITIAL    Collection Time: 05/08/17  1:32 PM   Result Value Ref Range    Ventricular Rate 79 BPM    Atrial Rate 79 BPM    P-R Interval 160 ms    QRS Duration 110 ms    Q-T Interval 396 ms    QTC Calculation (Bezet) 454 ms    Calculated P Axis 115 degrees    Calculated R Axis -40 degrees    Calculated T Axis 24 degrees    Diagnosis       Normal sinus rhythm  Left axis deviation  Pulmonary disease pattern  Abnormal ECG  When compared with ECG of 16-JUN-2014 16:15,  Vent. rate has increased BY  29 BPM     CBC WITH AUTOMATED DIFF    Collection Time: 05/08/17  3:40 PM   Result Value Ref Range    WBC 6.8 4.6 - 13.2 K/uL    RBC 3.98 (L) 4.20 - 5.30 M/uL    HGB 12.3 12.0 - 16.0 g/dL    HCT 38.3 35.0 - 45.0 %    MCV 96.2 74.0 - 97.0 FL    MCH 30.9 24.0 - 34.0 PG    MCHC 32.1 31.0 - 37.0 g/dL    RDW 15.6 (H) 11.6 - 14.5 %    PLATELET 044 (L) 358 - 420 K/uL    MPV 9.8 9.2 - 11.8 FL    NEUTROPHILS 61 40 - 73 %    LYMPHOCYTES 27 21 - 52 %    MONOCYTES 6 3 - 10 %    EOSINOPHILS 5 0 - 5 %    BASOPHILS 1 0 - 2 %    ABS. NEUTROPHILS 4.2 1.8 - 8.0 K/UL    ABS. LYMPHOCYTES 1.8 0.9 - 3.6 K/UL    ABS. MONOCYTES 0.4 0.05 - 1.2 K/UL    ABS. EOSINOPHILS 0.3 0.0 - 0.4 K/UL    ABS.  BASOPHILS 0.0 0.0 - 0.06 K/UL    DF AUTOMATED     METABOLIC PANEL, COMPREHENSIVE    Collection Time: 05/08/17  3:40 PM   Result Value Ref Range    Sodium 141 136 - 145 mmol/L    Potassium 4.5 3.5 - 5.5 mmol/L    Chloride 104 100 - 108 mmol/L    CO2 25 21 - 32 mmol/L    Anion gap 12 3.0 - 18 mmol/L    Glucose 118 (H) 74 - 99 mg/dL    BUN 24 (H) 7.0 - 18 MG/DL    Creatinine 0.91 0.6 - 1.3 MG/DL    BUN/Creatinine ratio 26 (H) 12 - 20      GFR est AA >60 >60 ml/min/1.73m2    GFR est non-AA >60 >60 ml/min/1.73m2    Calcium 9.3 8.5 - 10.1 MG/DL    Bilirubin, total 0.4 0.2 - 1.0 MG/DL    ALT (SGPT) 32 13 - 56 U/L    AST (SGOT) 43 (H) 15 - 37 U/L    Alk. phosphatase 50 45 - 117 U/L    Protein, total 6.1 (L) 6.4 - 8.2 g/dL    Albumin 3.5 3.4 - 5.0 g/dL    Globulin 2.6 2.0 - 4.0 g/dL    A-G Ratio 1.3 0.8 - 1.7     CARDIAC PANEL,(CK, CKMB & TROPONIN)    Collection Time: 05/08/17  3:40 PM   Result Value Ref Range    CK 35 26 - 192 U/L    CK - MB 1.1 <3.6 ng/ml    CK-MB Index 3.1 0.0 - 4.0 %    Troponin-I, Qt. <0.02 0.0 - 0.045 NG/ML   POC TROPONIN-I    Collection Time: 05/08/17  4:43 PM   Result Value Ref Range    Troponin-I (POC) <0.04 0.00 - 0.08 ng/mL       EKG interpretation by ED Physician:       X-Ray, CT or other radiology findings or impressions:  XR CHEST PORT   Final Result   IMPRESSION:     Left lung base is obscured, cannot exclude left basilar disease. Consider  follow-up PA and lateral chest x-ray when clinically feasible.     Per radiologist       Orders:  Orders Placed This Encounter    XR CHEST PORT     Standing Status:   Standing     Number of Occurrences:   1     Order Specific Question:   Reason for Exam     Answer:   near syncope    CBC WITH AUTOMATED DIFF     Standing Status:   Standing     Number of Occurrences:   1    METABOLIC PANEL, COMPREHENSIVE     Standing Status:   Standing     Number of Occurrences:   1    CARDIAC PANEL,(CK, CKMB & TROPONIN)     Standing Status:   Standing     Number of Occurrences:   1    POC TROPONIN-I Standing Status:   Standing     Number of Occurrences:   1    POC TROPONIN-I     Standing Status:   Standing     Number of Occurrences:   1    EKG, 12 LEAD, INITIAL     Standing Status:   Standing     Number of Occurrences:   1     Order Specific Question:   Reason for Exam:     Answer:   slow heart rate    IP CONSULT TO CARDIOLOGY     Standing Status:   Standing     Number of Occurrences:   1     Order Specific Question:   Reason for Consult: Answer:   bradycardia     Order Specific Question:   Did you call or speak to the consulting provider? Answer:   No     Order Specific Question:   Consult To     Answer:   Dr Jaymie Yusuf Specific Question:   Schedule When? Answer:   TODAY       Reevaluation, Progress notes, Consult notes, or additional Procedure notes:   2:11 PM Consult: I discussed care with Camila Melgar (Cardiology PA for Dr. Daniel Carvajal). It was a standard discussion including patient history, chief complaint, available diagnostic results, and predicted treatment course. She states she is going to consult with Dr. Daniel Carvajal and figure out the next steps to take. 2:13 PM Consult: I discussed care with Camila Melgar (Cardiology). It was a standard discussion including patient history, chief complaint, available diagnostic results, and predicted treatment course. She states to stop beta blockers and recommended follow up to discuss need of pacemaker. Disposition:  Diagnosis:   1. Near syncope    2.  Bradycardia        Disposition:     Follow-up Information     Follow up With Details Comments Contact [facility] EMERGENCY DEPT  As needed, If symptoms worsen [address] [place] [address]    Keila Howard MD Schedule an appointment as soon as possible for a visit for ED follow up appointment  [address]  [address] [place], [place] [address]  [facility]  [facility] [phone]             Patient's Medications   Start Taking    No medications on file   Continue Taking    ALBUTEROL (PROAIR HFA) 90 MCG/ACTUATION INHALER    Take  by inhalation. ASPIRIN DELAYED-RELEASE 325 MG TABLET    TAKE 1 TABLET BY MOUTH DAILY    BUPROPION SR (WELLBUTRIN, ZYBAN) 200 MG SR TABLET    Take 200 mg by mouth daily. ERGOCALCIFEROL (VITAMIN D2) 50,000 UNIT CAPSULE    Take 50,000 Units by mouth every seven (7) days. EVERY 2 WEEKS    FLUTICASONE (FLOVENT DISKUS) 50 MCG/ACTUATION INHALER    Take  by inhalation. LOSARTAN (COZAAR) 50 MG TABLET    Take 50 mg by mouth daily. METOPROLOL TARTRATE (LOPRESSOR) 25 MG TABLET    TAKE 1 TABLET BY MOUTH TWICE DAILY    NIFEDIPINE ER (PROCARDIA XL) 60 MG ER TABLET    Take 60 mg by mouth daily. NYSTATIN-TRIAMCINOLONE (MYCOLOG II) TOPICAL CREAM    Apply  to affected area two (2) times a day. OMEGA-3-DHA-EPA-FISH OIL 1,000 (120-180) MG CAP    Take  by mouth. OMEPRAZOLE (PRILOSEC) 20 MG CAPSULE    Take 20 mg by mouth daily. SERTRALINE (ZOLOFT) 100 MG TABLET    Take 100 mg by mouth two (2) times a day. SYMBICORT 160-4.5 MCG/ACTUATION HFA INHALER        TOLTERODINE ER (DETROL LA) 4 MG ER CAPSULE    Take 4 mg by mouth daily. TROSPIUM (SANCTURA XL) 60 MG CAPSULE    Take 60 mg by mouth Daily (before breakfast). These Medications have changed    No medications on file   Stop Taking    No medications on file         Scribe Attestation  Key Rosario scribing for and in the presence of Franklin Garcia MD (05/08/17)      Physician Attestation  I personally performed the services described in this documentation, reviewed and edited the documentation which was dictated to the scribe in my presence, and it accurately records my words and actions.     Franklin Garcia MD (05/08/17)      Signed by: Suzanna Fisher, May 08, 2017 at 1:50 PM

## 2017-05-08 NOTE — DISCHARGE INSTRUCTIONS
Bradycardia: Care Instructions  Your Care Instructions    Bradycardia is a slow heart rate. If your heart beats too slowly, it can't supply your body with enough blood. This can make you weak or dizzy. Or it may make you pass out. Sometimes medicine can cause this problem. If this happens, your doctor may have you adjust one of your medicines. If a medicine is not the problem, your doctor may recommend a pacemaker. It is important to treat bradycardia so that you don't get more serious health problems. Your doctor will want to see you on a routine schedule to make sure that your heartbeat is normal.  Follow-up care is a key part of your treatment and safety. Be sure to make and go to all appointments, and call your doctor if you are having problems. It's also a good idea to know your test results and keep a list of the medicines you take. How can you care for yourself at home? · Take your medicines exactly as prescribed. Call your doctor if you think you are having a problem with your medicine. If your bradycardia is caused by another disease, your doctor will try to treat the disease. If it is caused by heart medicines, he or she will adjust your medicines. · Make lifestyle changes to improve your heart health. ¨ To control your cholesterol, avoid foods with a lot of fat, saturated fat, or sodium. Try to eat more fiber. And if your doctor says it's okay, get some exercise on most days. ¨ Do not smoke. Smoking can make your heart condition worse. If you need help quitting, talk to your doctor about stop-smoking programs and medicines. These can increase your chances of quitting for good. ¨ Limit alcohol to 2 drinks a day for men and 1 drink a day for women. Too much alcohol can cause health problems. Pacemaker  If you have a pacemaker, you will get more specific information about it. Be sure to:  · Check your pulse as your doctor tells you.   · Have your pacemaker checked as often as your doctor recommends. You may be able to do this over the phone or computer. · Avoid strong magnetic or electrical fields. These include wand metal detectors used in airports, MRIs, welding equipment, and generators. · You will be checked several times right after you get your pacemaker and when it is time to have the battery changed. Batteries last for 5 to 15 years. · You can talk on a cell phone. But keep it 6 inches away from your pacemaker. · Microwaves, TVs, radios, and kitchen and bathroom appliances won't harm you. When should you call for help? Call 911 anytime you think you may need emergency care. For example, call if:  · You passed out (lost consciousness). · You have symptoms of a heart attack. These may include:  ¨ Chest pain or pressure, or a strange feeling in the chest.  ¨ Sweating. ¨ Shortness of breath. ¨ Nausea or vomiting. ¨ Pain, pressure, or a strange feeling in the back, neck, jaw, or upper belly or in one or both shoulders or arms. ¨ Lightheadedness or sudden weakness. ¨ A fast or irregular heartbeat. After you call 911, the  may tell you to chew 1 adult-strength or 2 to 4 low-dose aspirin. Wait for an ambulance. Do not try to drive yourself. · You have symptoms of a stroke. These may include:  ¨ Sudden numbness, tingling, weakness, or loss of movement in your face, arm, or leg, especially on only one side of your body. ¨ Sudden vision changes. ¨ Sudden trouble speaking. ¨ Sudden confusion or trouble understanding simple statements. ¨ Sudden problems with walking or balance. ¨ A sudden, severe headache that is different from past headaches. Call your doctor now or seek immediate medical care if:  · You are dizzy or lightheaded, or you feel like you may faint. · You have new or increased shortness of breath. · You had a pacemaker implanted and you have signs of infection, such as:  ¨ Increased pain, swelling, warmth, or redness.   ¨ Red streaks leading from the cut (incision). ¨ Pus draining from the incision. ¨ A fever. Watch closely for changes in your health, and be sure to contact your doctor if you have any problems. Where can you learn more? Go to http://gokul-mack.info/. Enter H204 in the search box to learn more about \"Bradycardia: Care Instructions. \"  Current as of: January 27, 2016  Content Version: 11.2  © 6826-9328 [employer]. Care instructions adapted under license by [employer] (which disclaims liability or warranty for this information). If you have questions about a medical condition or this instruction, always ask your healthcare professional. [address] any warranty or liability for your use of this information.

## 2017-05-09 LAB
ATRIAL RATE: 79 BPM
CALCULATED P AXIS, ECG09: 115 DEGREES
CALCULATED R AXIS, ECG10: -40 DEGREES
CALCULATED T AXIS, ECG11: 24 DEGREES
DIAGNOSIS, 93000: NORMAL
P-R INTERVAL, ECG05: 160 MS
Q-T INTERVAL, ECG07: 396 MS
QRS DURATION, ECG06: 110 MS
QTC CALCULATION (BEZET), ECG08: 454 MS
VENTRICULAR RATE, ECG03: 79 BPM

## 2017-05-23 ENCOUNTER — OFFICE VISIT (OUTPATIENT)
Dept: CARDIOLOGY CLINIC | Age: 72
End: 2017-05-23

## 2017-05-23 ENCOUNTER — HOSPITAL ENCOUNTER (OUTPATIENT)
Dept: NON INVASIVE DIAGNOSTICS | Age: 72
Discharge: HOME | End: 2017-05-23
Attending: INTERNAL MEDICINE
Payer: MEDICARE

## 2017-05-23 VITALS
BODY MASS INDEX: 59.07 KG/M2 | HEIGHT: 59 IN | DIASTOLIC BLOOD PRESSURE: 76 MMHG | HEART RATE: 114 BPM | SYSTOLIC BLOOD PRESSURE: 142 MMHG | OXYGEN SATURATION: 96 % | WEIGHT: 293 LBS

## 2017-05-23 DIAGNOSIS — I48.0 PAROXYSMAL ATRIAL FIBRILLATION (HCC): Primary | ICD-10-CM

## 2017-05-23 DIAGNOSIS — I48.0 PAROXYSMAL ATRIAL FIBRILLATION (HCC): ICD-10-CM

## 2017-05-23 PROCEDURE — 93225 XTRNL ECG REC<48 HRS REC: CPT | Performed by: INTERNAL MEDICINE

## 2017-05-23 RX ORDER — CARVEDILOL 3.12 MG/1
3.12 TABLET ORAL 2 TIMES DAILY WITH MEALS
Qty: 60 TAB | Refills: 6 | Status: SHIPPED | OUTPATIENT
Start: 2017-05-23 | End: 2017-05-23 | Stop reason: SDUPTHER

## 2017-05-23 RX ORDER — CARVEDILOL 3.12 MG/1
TABLET ORAL
Qty: 180 TAB | Refills: 6 | Status: SHIPPED | OUTPATIENT
Start: 2017-05-23 | End: 2018-06-19 | Stop reason: SDUPTHER

## 2017-05-23 NOTE — MR AVS SNAPSHOT
Visit Information Date & Time Provider Department Dept. Phone Encounter #  
 5/23/2017 11:30 AM Rafi Rojas  [facility] Specialist at [facility] [address] [phone] [id number] Follow-up Instructions Return in about 4 weeks (around 6/20/2017). Upcoming Health Maintenance Date Due HEMOGLOBIN A1C Q6M 1945 FOOT EXAM Q1 11/1/1954 MICROALBUMIN Q1 11/1/1954 EYE EXAM RETINAL OR DILATED Q1 11/1/1954 DTaP/Tdap/Td series (1 - Tdap) 11/1/1965 FOBT Q 1 YEAR AGE 50-75 11/1/1994 ZOSTER VACCINE AGE 60> 11/1/2004 GLAUCOMA SCREENING Q2Y 11/1/2009 Pneumococcal 65+ Low/Medium Risk (1 of 2 - PCV13) 11/1/2009 MEDICARE YEARLY EXAM 11/1/2009 LIPID PANEL Q1 8/17/2011 INFLUENZA AGE 9 TO ADULT 8/1/2017 BREAST CANCER SCRN MAMMOGRAM 2/17/2019 Allergies as of 5/23/2017  Review Complete On: 5/23/2017 By: Precious Perkins RN Severity Noted Reaction Type Reactions Sulfa (Sulfonamide Antibiotics)  05/14/2013    Hives Current Immunizations  Never Reviewed No immunizations on file. Not reviewed this visit You Were Diagnosed With   
  
 Codes Comments Paroxysmal atrial fibrillation (HCC)    -  Primary ICD-10-CM: I48.0 ICD-9-CM: 427.31 Vitals BP Pulse Height(growth percentile) Weight(growth percentile) SpO2 BMI  
 142/76 (!) 114 4' 11\" (1.499 m) 293 lb (132.9 kg) 96% 59.18 kg/m2 OB Status Smoking Status Hysterectomy Never Smoker BMI and BSA Data Body Mass Index Body Surface Area  
 59.18 kg/m 2 2.35 m 2 Preferred Pharmacy Pharmacy Name Phone [facility] [place], [address] [place] [address] [phone] Your Updated Medication List  
  
   
This list is accurate as of: 5/23/17 11:50 AM.  Always use your most recent med list.  
  
  
  
  
 aspirin delayed-release 325 mg tablet TAKE 1 TABLET BY MOUTH DAILY buPROPion  mg SR tablet Commonly known as:  Luis Miguel Colon Take 200 mg by mouth daily. fluticasone 50 mcg/actuation inhaler Commonly known as:  FLOVENT DISKUS Take  by inhalation. losartan 50 mg tablet Commonly known as:  COZAAR Take 50 mg by mouth daily. NIFEdipine ER 60 mg ER tablet Commonly known as:  PROCARDIA XL Take 60 mg by mouth daily. nystatin-triamcinolone topical cream  
Commonly known as:  MYCOLOG II Apply  to affected area two (2) times a day. Omega-3-DHA-EPA-Fish Oil 1,000 mg (120 mg-180 mg) Cap Take  by mouth. omeprazole 20 mg capsule Commonly known as:  PRILOSEC Take 20 mg by mouth daily. PROAIR HFA 90 mcg/actuation inhaler Generic drug:  albuterol Take  by inhalation. sertraline 100 mg tablet Commonly known as:  ZOLOFT Take 100 mg by mouth two (2) times a day. SYMBICORT 160-4.5 mcg/actuation HFA inhaler Generic drug:  budesonide-formoterol  
  
 tolterodine ER 4 mg ER capsule Commonly known as:  Bevelyn Zackery Take 4 mg by mouth daily. trospium 60 mg capsule Commonly known as:  SANCTURA XL Take 60 mg by mouth Daily (before breakfast). VITAMIN D2 50,000 unit capsule Generic drug:  ergocalciferol Take 50,000 Units by mouth every seven (7) days. EVERY 2 WEEKS We Performed the Following AMB POC EKG ROUTINE W/ 12 LEADS, INTER & REP [17884 CPT(R)] Follow-up Instructions Return in about 4 weeks (around 6/20/2017). To-Do List   
 05/23/2017 ECG:  ECG HOLTER MONITOR, UP TO 48 HRS   
  
 07/25/2017 ECHO:  2D ECHO COMPLETE ADULT (TTE) W OR WO CONTR Patient Instructions Stop Metoprolol Start Coreg 3.125mg Twice per day Introducing hospitals & HEALTH SERVICES! Dear Elie Vallecillo: Thank you for requesting a [email or web address] account.   Our records indicate that you have previously registered for a [email or web address] account but its currently inactive. Please call our [employer] support line at [phone]. Additional Information If you have questions, please visit the Frequently Asked Questions section of the [employer] website at [email or web address] Remember, [employer] is NOT to be used for urgent needs. For medical emergencies, dial 911. Now available from your iPhone and Android! Please provide this summary of care documentation to your next provider. Your primary care clinician is listed as Carlos Gaston. If you have any questions after today's visit, please call [phone].

## 2017-05-23 NOTE — PROGRESS NOTES
[facility]       Ms. Everardo Hopkins is a 66 y.o. woman with diabetes, hypertension and dyslipidemia. She has paroxysmal atrial fibrillation as noted on Holter monitor in July of 2014. Ms. Everardo Hopkins is here today for follow up appointment. She recently went to the emergency department after having some episode of shortness of breath and dizziness. She was at PCP's office and her heart rate was in the high 40s. She was sent to the emergency department. However in the emergency department the heart rate report was in the 70s and 80s. She used to take Metoprolol. After that she has stopped taking Metoprolol. She is here today and says she does not have any significant dizziness, presyncope or syncope. She used to be on Lopressor 50 mg in the past, which was reduced to 25 mg eventually. Now she's off of that. She also has some shortness of breath, especially when she lays down. She denies any chest pain or chest tightness. Past Medical History:   Diagnosis Date    Anxiety     Atrial fibrillation (HCC)     CHADS score 2  (-CHF, +HTN, -AGE, +DM, -CVA)    COPD     Degenerative spine disease     Diabetes ([address]     Dyslipidemia     Hypertension     Major depression     Morbid obesity ([address]     Neuropathic pain     Sleep apnea        Past Surgical History:   Procedure Laterality Date    HX ABDOMINOPLASTY      HX HYSTERECTOMY         Current Outpatient Prescriptions   Medication Sig    aspirin delayed-release 325 mg tablet TAKE 1 TABLET BY MOUTH DAILY    SYMBICORT 160-4.5 mcg/actuation HFA inhaler     trospium (SANCTURA XL) 60 mg capsule Take 60 mg by mouth Daily (before breakfast).  fluticasone (FLOVENT DISKUS) 50 mcg/actuation inhaler Take  by inhalation.  nystatin-triamcinolone (MYCOLOG II) topical cream Apply  to affected area two (2) times a day.  albuterol (PROAIR HFA) 90 mcg/actuation inhaler Take  by inhalation.     Omega-3-DHA-EPA-Fish Oil 1,000 (120-180) mg cap Take  by mouth.  omeprazole (PRILOSEC) 20 mg capsule Take 20 mg by mouth daily.  losartan (COZAAR) 50 mg tablet Take 50 mg by mouth daily.  buPROPion SR (WELLBUTRIN, ZYBAN) 200 mg SR tablet Take 200 mg by mouth daily.  ergocalciferol (VITAMIN D2) 50,000 unit capsule Take 50,000 Units by mouth every seven (7) days. EVERY 2 WEEKS    sertraline (ZOLOFT) 100 mg tablet Take 100 mg by mouth two (2) times a day.  NIFEdipine ER (PROCARDIA XL) 60 mg ER tablet Take 60 mg by mouth daily.  tolterodine ER (DETROL LA) 4 mg ER capsule Take 4 mg by mouth daily. No current facility-administered medications for this visit. Allergies   Allergen Reactions    Sulfa (Sulfonamide Antibiotics) Hives       Family History:   Non contributory for premature coronary disease in first degree relatives. Social History:   She  reports that she has never smoked. She does not have any smokeless tobacco history on file. She  reports that she does not drink alcohol. Physical:   Vitals:   BP: 142/76  HR: (!) 114  Wt: 293 lb (132.9 kg)    Exam:   GENERAL:  Ms. Marisol Tripp is an older woman without complaints. CHEST:  Clear with good effort. CARDIAC:  Regular  ABDOMEN:  Soft. Bowel sounds present. EXTREMITIES:  No edema. Scattered ecchymoses. CAROTID:  No bruit.   NECK:  No JVD    Review of Data:   LABS:   Lab Results   Component Value Date/Time    WBC 6.8 05/08/2017 03:40 PM    HGB 12.3 05/08/2017 03:40 PM    HCT 38.3 05/08/2017 03:40 PM    PLATELET 535 57/23/8796 03:40 PM     Lab Results   Component Value Date/Time    Sodium 141 05/08/2017 03:40 PM    Potassium 4.5 05/08/2017 03:40 PM    Chloride 104 05/08/2017 03:40 PM    CO2 25 05/08/2017 03:40 PM    Glucose 118 05/08/2017 03:40 PM    BUN 24 05/08/2017 03:40 PM    Creatinine 0.91 05/08/2017 03:40 PM     Lab Results   Component Value Date/Time    Cholesterol, total 176 08/17/2010 02:30 PM    HDL Cholesterol 33 08/17/2010 02:30 PM    LDL, calculated 83.6 08/17/2010 02:30 PM    Triglyceride 297 08/17/2010 02:30 PM    CHOL/HDL Ratio 5.3 08/17/2010 02:30 PM     No results found for: GPT  No results found for: HBA1C, SHF2CMCH    EKG: July 2014:  double voltage standard. Sinus tachycardia 121 beats per minute. Nonspecific interventricular conduction delay. Nonspecific ST-T wave changes. HOLTER: July 2014:  Rhythm was sinus with intermittent short run of atrial fibrillation. Atrial fibrillation was present for 0.31% of the recording time. The average heart rate, excluding ectopy, was 77 beats per minute, with a minimum heart rate of 55 beats per minute at 8:42 a.m., and a peak heart rate of 126 beats per minute at 5:59 p.m. Heartbeats, including ectopy, totalled 183,947 beats. Ventricular ectopy:  None identified. Supraventricular ectopy:  250 total, averaging 6.5 per hour, with 216 single events and 34 paired beats. Interpretation:  1. Rhythm is sinus with intermittent brief run of atrial fibrillation. Patient was in atrial fibrillation for 0.31% of the recording time. 2. DC and QRS are within normal limits. 3. 216 PACs, 34 paired events. ECHO: July 2014:  LEFT VENTRICLE: Size was normal. Systolic function was at the lower limits of normal by visual assessment. Ejection fraction was estimated in the range of 50-55%. No obvious wall motion abnormalities identified in the views obtained. Wall thickness was mildly increased. DOPPLER: Doppler parameters were consistent with abnormal left ventricular relaxation (grade 1 diastolic dysfunction). RIGHT VENTRICLE: The size was normal. Systolic function was normal. Wall thickness was normal. DOPPLER: The tricuspid jet envelope definition was inadequate for estimation of RV systolic pressure. There are no indirect findings (abnormal RV volume or geometry, altered pulmonary flow velocity profile, or leftward septal displacement) which would suggest moderate or severe pulmonary hypertension.     LEFT ATRIUM: Size was normal. LA volume index was 12 ml/m squared. RIGHT ATRIUM: Size was normal.    MITRAL VALVE: There was mild annular calcification. There was minimal thickening. DOPPLER: There was no evidence for stenosis. There was mild regurgitation. AORTIC VALVE: The valve was trileaflet. Leaflets exhibited mildly increased thickness and mild calcification. DOPPLER: There was no stenosis. There was no significant regurgitation. TRICUSPID VALVE: There was normal leaflet separation. DOPPLER: There was no evidence for tricuspid stenosis. There was trivial regurgitation. PULMONIC VALVE: Not well visualized, but normal Doppler findings. AORTA: The root exhibited normal size. PERICARDIUM: No significant pericardial effusion identified. A pericardial fat pad was present. Impression / Plan:     Diabetes    Hypertension    Dyslipidemia    Atrial fibrillation     Paroxysmal atrial fibrillation: This diagnosis was made on a Holter monitor performed in July, 2014. All electrocardiograms since then have shown sinus rhythm. EKG today showed sinus tachycardia. She was on metoprolol 25 mg BID but discontinued after recent ED visit because of ?? Bradycardia. Not documented  Will try non-selective coreg for BP and tachycardia. Would place holter monitor to check HR variability. ASA for now for stroke prophylaxis. Hypertension:  Her blood pressure today is 142/76 mmHg. She's on Losartan and Nifedipine, which I recommend to continue. Metoprolol DC as mentioned above and will use coreg. She will call us back with BP and HR reading at home. Goal -140 mm Hg. Patient understood. Diabetes: This is being managed by primary care provider. She is not on any oral agent at this time. Goal hemoglobin A1c less than 7 is recommended from cardiovascular standpoint. This is being managed by Dr. Gaurav Ramos. Hyperlipidemia:  Currently she is taking omega 3 fatty acid.   She is not on any statin medication. This is being managed by primary care provider. I would recommend to continue with same medication for now. Other than the above, or unless any additional issues arise, I have asked that she follow up in 2 months.

## 2017-05-23 NOTE — LETTER
Patient:  Gerson Ross YOB: 1945 Date of Visit: 5/23/2017 Dear [place] L[place] MD 
[id number] [facility] [id number] VIA Facsimile: [phone] 
 : 
 
 
 
     [facility] Ms. Alethea Gallardo is a 66 y.o. woman with diabetes, hypertension and dyslipidemia. She has paroxysmal atrial fibrillation as noted on Holter monitor in July of 2014. Ms. Alethea Gallardo is here today for follow up appointment. She recently went to the emergency department after having some episode of shortness of breath and dizziness. She was at PCP's office and her heart rate was in the high 40s. She was sent to the emergency department. However in the emergency department the heart rate report was in the 70s and 80s. She used to take Metoprolol. After that she has stopped taking Metoprolol. She is here today and says she does not have any significant dizziness, presyncope or syncope. She used to be on Lopressor 50 mg in the past, which was reduced to 25 mg eventually. Now she's off of that. She also has some shortness of breath, especially when she lays down. She denies any chest pain or chest tightness. Past Medical History:  
Diagnosis Date  Anxiety  Atrial fibrillation ([address] CHADS score 2  (-CHF, +HTN, -AGE, +DM, -CVA)  COPD  Degenerative spine disease  Diabetes ([address]  Dyslipidemia  Hypertension  Major depression  Morbid obesity ([address]  Neuropathic pain  Sleep apnea Past Surgical History:  
Procedure Laterality Date  HX ABDOMINOPLASTY  HX HYSTERECTOMY Current Outpatient Prescriptions Medication Sig  
 aspirin delayed-release 325 mg tablet TAKE 1 TABLET BY MOUTH DAILY  SYMBICORT 160-4.5 mcg/actuation HFA inhaler  trospium (SANCTURA XL) 60 mg capsule Take 60 mg by mouth Daily (before breakfast).  fluticasone (FLOVENT DISKUS) 50 mcg/actuation inhaler Take  by inhalation.  nystatin-triamcinolone (MYCOLOG II) topical cream Apply  to affected area two (2) times a day.  albuterol (PROAIR HFA) 90 mcg/actuation inhaler Take  by inhalation.  Omega-3-DHA-EPA-Fish Oil 1,000 (120-180) mg cap Take  by mouth.  omeprazole (PRILOSEC) 20 mg capsule Take 20 mg by mouth daily.  losartan (COZAAR) 50 mg tablet Take 50 mg by mouth daily.  buPROPion SR (WELLBUTRIN, ZYBAN) 200 mg SR tablet Take 200 mg by mouth daily.  ergocalciferol (VITAMIN D2) 50,000 unit capsule Take 50,000 Units by mouth every seven (7) days. EVERY 2 WEEKS  sertraline (ZOLOFT) 100 mg tablet Take 100 mg by mouth two (2) times a day.  NIFEdipine ER (PROCARDIA XL) 60 mg ER tablet Take 60 mg by mouth daily.  tolterodine ER (DETROL LA) 4 mg ER capsule Take 4 mg by mouth daily. No current facility-administered medications for this visit. Allergies Allergen Reactions  Sulfa (Sulfonamide Antibiotics) Hives Family History:  
Non contributory for premature coronary disease in first degree relatives. Social History: She  reports that she has never smoked. She does not have any smokeless tobacco history on file. She  reports that she does not drink alcohol. Physical:  
Vitals:  
BP: 142/76 HR: (!) 114 Wt: 293 lb (132.9 kg) Exam:  
GENERAL:  Ms. Coty Posada is an older woman without complaints. CHEST:  Clear with good effort. CARDIAC:  Regular ABDOMEN:  Soft. Bowel sounds present. EXTREMITIES:  No edema. Scattered ecchymoses. CAROTID:  No bruit. NECK:  No JVD Review of Data:  
LABS:  
Lab Results Component Value Date/Time WBC 6.8 05/08/2017 03:40 PM  
 HGB 12.3 05/08/2017 03:40 PM  
 HCT 38.3 05/08/2017 03:40 PM  
 PLATELET 182 71/16/9609 03:40 PM  
 
Lab Results Component Value Date/Time  Sodium 141 05/08/2017 03:40 PM  
 Potassium 4.5 05/08/2017 03:40 PM  
 Chloride 104 05/08/2017 03:40 PM  
 CO2 25 05/08/2017 03:40 PM  
 Glucose 118 05/08/2017 03:40 PM  
 BUN 24 05/08/2017 03:40 PM  
 Creatinine 0.91 05/08/2017 03:40 PM  
 
Lab Results Component Value Date/Time Cholesterol, total 176 08/17/2010 02:30 PM  
 HDL Cholesterol 33 08/17/2010 02:30 PM  
 LDL, calculated 83.6 08/17/2010 02:30 PM  
 Triglyceride 297 08/17/2010 02:30 PM  
 CHOL/HDL Ratio 5.3 08/17/2010 02:30 PM  
 
No results found for: GPT No results found for: HBA1C, [id number] 
 
EKG: July 2014: 
double voltage standard. Sinus tachycardia 121 beats per minute. Nonspecific interventricular conduction delay. Nonspecific ST-T wave changes. HOLTER: July 2014: 
Rhythm was sinus with intermittent short run of atrial fibrillation. Atrial fibrillation was present for 0.31% of the recording time. The average heart rate, excluding ectopy, was 77 beats per minute, with a minimum heart rate of 55 beats per minute at 8:42 a.m., and a peak heart rate of 126 beats per minute at 5:59 p.m. Heartbeats, including ectopy, totalled 183,947 beats. Ventricular ectopy:  None identified. Supraventricular ectopy:  250 total, averaging 6.5 per hour, with 216 single events and 34 paired beats. Interpretation: 1. Rhythm is sinus with intermittent brief run of atrial fibrillation. Patient was in atrial fibrillation for 0.31% of the recording time. 2. KS and QRS are within normal limits. 3. 216 PACs, 34 paired events. ECHO: July 2014: 
LEFT VENTRICLE: Size was normal. Systolic function was at the lower limits of normal by visual assessment. Ejection fraction was estimated in the range of 50-55%. No obvious wall motion abnormalities identified in the views obtained. Wall thickness was mildly increased. DOPPLER: Doppler parameters were consistent with abnormal left ventricular relaxation (grade 1 diastolic dysfunction).  
 
RIGHT VENTRICLE: The size was normal. Systolic function was normal. Wall thickness was normal. DOPPLER: The tricuspid jet envelope definition was inadequate for estimation of RV systolic pressure. There are no indirect findings (abnormal RV volume or geometry, altered pulmonary flow velocity profile, or leftward septal displacement) which would suggest moderate or severe pulmonary hypertension. LEFT ATRIUM: Size was normal. LA volume index was 12 ml/m squared. RIGHT ATRIUM: Size was normal. 
 
MITRAL VALVE: There was mild annular calcification. There was minimal thickening. DOPPLER: There was no evidence for stenosis. There was mild regurgitation. AORTIC VALVE: The valve was trileaflet. Leaflets exhibited mildly increased thickness and mild calcification. DOPPLER: There was no stenosis. There was no significant regurgitation. TRICUSPID VALVE: There was normal leaflet separation. DOPPLER: There was no evidence for tricuspid stenosis. There was trivial regurgitation. PULMONIC VALVE: Not well visualized, but normal Doppler findings. AORTA: The root exhibited normal size. PERICARDIUM: No significant pericardial effusion identified. A pericardial fat pad was present. Impression / Plan:  Diabetes  Hypertension  Dyslipidemia  Atrial fibrillation Paroxysmal atrial fibrillation: This diagnosis was made on a Holter monitor performed in July, 2014. All electrocardiograms since then have shown sinus rhythm. EKG today showed sinus tachycardia. She was on metoprolol 25 mg BID but discontinued after recent ED visit because of ?? Bradycardia. Not documented Will try non-selective coreg for BP and tachycardia. Would place holter monitor to check HR variability. ASA for now for stroke prophylaxis. Hypertension:  Her blood pressure today is 142/76 mmHg. She's on Losartan and Nifedipine, which I recommend to continue. Metoprolol DC as mentioned above and will use coreg. She will call us back with BP and HR reading at home. Goal -140 mm Hg. Patient understood. Diabetes: This is being managed by primary care provider. She is not on any oral agent at this time. Goal hemoglobin A1c less than 7 is recommended from cardiovascular standpoint. This is being managed by Dr. Estiven Oakes. Hyperlipidemia:  Currently she is taking omega 3 fatty acid. She is not on any statin medication. This is being managed by primary care provider. I would recommend to continue with same medication for now. Other than the above, or unless any additional issues arise, I have asked that she follow up in 2 months. Sincerely, Aquiles Guy MD

## 2017-05-26 NOTE — COMMUNICATION BODY
[facility]       Ms. Elpidio Guadarrama is a 66 y.o. woman with diabetes, hypertension and dyslipidemia. She has paroxysmal atrial fibrillation as noted on Holter monitor in July of 2014. Ms. Elpidio Guadarrama is here today for follow up appointment. She recently went to the emergency department after having some episode of shortness of breath and dizziness. She was at PCP's office and her heart rate was in the high 40s. She was sent to the emergency department. However in the emergency department the heart rate report was in the 70s and 80s. She used to take Metoprolol. After that she has stopped taking Metoprolol. She is here today and says she does not have any significant dizziness, presyncope or syncope. She used to be on Lopressor 50 mg in the past, which was reduced to 25 mg eventually. Now she's off of that. She also has some shortness of breath, especially when she lays down. She denies any chest pain or chest tightness. Past Medical History:   Diagnosis Date    Anxiety     Atrial fibrillation (HCC)     CHADS score 2  (-CHF, +HTN, -AGE, +DM, -CVA)    COPD     Degenerative spine disease     Diabetes ([address]     Dyslipidemia     Hypertension     Major depression     Morbid obesity ([address]     Neuropathic pain     Sleep apnea        Past Surgical History:   Procedure Laterality Date    HX ABDOMINOPLASTY      HX HYSTERECTOMY         Current Outpatient Prescriptions   Medication Sig    aspirin delayed-release 325 mg tablet TAKE 1 TABLET BY MOUTH DAILY    SYMBICORT 160-4.5 mcg/actuation HFA inhaler     trospium (SANCTURA XL) 60 mg capsule Take 60 mg by mouth Daily (before breakfast).  fluticasone (FLOVENT DISKUS) 50 mcg/actuation inhaler Take  by inhalation.  nystatin-triamcinolone (MYCOLOG II) topical cream Apply  to affected area two (2) times a day.  albuterol (PROAIR HFA) 90 mcg/actuation inhaler Take  by inhalation.     Omega-3-DHA-EPA-Fish Oil 1,000 (120-180) mg cap Take  by mouth.  omeprazole (PRILOSEC) 20 mg capsule Take 20 mg by mouth daily.  losartan (COZAAR) 50 mg tablet Take 50 mg by mouth daily.  buPROPion SR (WELLBUTRIN, ZYBAN) 200 mg SR tablet Take 200 mg by mouth daily.  ergocalciferol (VITAMIN D2) 50,000 unit capsule Take 50,000 Units by mouth every seven (7) days. EVERY 2 WEEKS    sertraline (ZOLOFT) 100 mg tablet Take 100 mg by mouth two (2) times a day.  NIFEdipine ER (PROCARDIA XL) 60 mg ER tablet Take 60 mg by mouth daily.  tolterodine ER (DETROL LA) 4 mg ER capsule Take 4 mg by mouth daily. No current facility-administered medications for this visit. Allergies   Allergen Reactions    Sulfa (Sulfonamide Antibiotics) Hives       Family History:   Non contributory for premature coronary disease in first degree relatives. Social History:   She  reports that she has never smoked. She does not have any smokeless tobacco history on file. She  reports that she does not drink alcohol. Physical:   Vitals:   BP: 142/76  HR: (!) 114  Wt: 293 lb (132.9 kg)    Exam:   GENERAL:  Ms. Sachi Rodas is an older woman without complaints. CHEST:  Clear with good effort. CARDIAC:  Regular  ABDOMEN:  Soft. Bowel sounds present. EXTREMITIES:  No edema. Scattered ecchymoses. CAROTID:  No bruit.   NECK:  No JVD    Review of Data:   LABS:   Lab Results   Component Value Date/Time    WBC 6.8 05/08/2017 03:40 PM    HGB 12.3 05/08/2017 03:40 PM    HCT 38.3 05/08/2017 03:40 PM    PLATELET 786 59/77/4939 03:40 PM     Lab Results   Component Value Date/Time    Sodium 141 05/08/2017 03:40 PM    Potassium 4.5 05/08/2017 03:40 PM    Chloride 104 05/08/2017 03:40 PM    CO2 25 05/08/2017 03:40 PM    Glucose 118 05/08/2017 03:40 PM    BUN 24 05/08/2017 03:40 PM    Creatinine 0.91 05/08/2017 03:40 PM     Lab Results   Component Value Date/Time    Cholesterol, total 176 08/17/2010 02:30 PM    HDL Cholesterol 33 08/17/2010 02:30 PM    LDL, calculated 83.6 08/17/2010 02:30 PM    Triglyceride 297 08/17/2010 02:30 PM    CHOL/HDL Ratio 5.3 08/17/2010 02:30 PM     No results found for: GPT  No results found for: HBA1C, [id number]    EKG: July 2014:  double voltage standard. Sinus tachycardia 121 beats per minute. Nonspecific interventricular conduction delay. Nonspecific ST-T wave changes. HOLTER: July 2014:  Rhythm was sinus with intermittent short run of atrial fibrillation. Atrial fibrillation was present for 0.31% of the recording time. The average heart rate, excluding ectopy, was 77 beats per minute, with a minimum heart rate of 55 beats per minute at 8:42 a.m., and a peak heart rate of 126 beats per minute at 5:59 p.m. Heartbeats, including ectopy, totalled 183,947 beats. Ventricular ectopy:  None identified. Supraventricular ectopy:  250 total, averaging 6.5 per hour, with 216 single events and 34 paired beats. Interpretation:  1. Rhythm is sinus with intermittent brief run of atrial fibrillation. Patient was in atrial fibrillation for 0.31% of the recording time. 2. CO and QRS are within normal limits. 3. 216 PACs, 34 paired events. ECHO: July 2014:  LEFT VENTRICLE: Size was normal. Systolic function was at the lower limits of normal by visual assessment. Ejection fraction was estimated in the range of 50-55%. No obvious wall motion abnormalities identified in the views obtained. Wall thickness was mildly increased. DOPPLER: Doppler parameters were consistent with abnormal left ventricular relaxation (grade 1 diastolic dysfunction). RIGHT VENTRICLE: The size was normal. Systolic function was normal. Wall thickness was normal. DOPPLER: The tricuspid jet envelope definition was inadequate for estimation of RV systolic pressure. There are no indirect findings (abnormal RV volume or geometry, altered pulmonary flow velocity profile, or leftward septal displacement) which would suggest moderate or severe pulmonary hypertension.     LEFT ATRIUM: Size was normal. LA volume index was 12 ml/m squared. RIGHT ATRIUM: Size was normal.    MITRAL VALVE: There was mild annular calcification. There was minimal thickening. DOPPLER: There was no evidence for stenosis. There was mild regurgitation. AORTIC VALVE: The valve was trileaflet. Leaflets exhibited mildly increased thickness and mild calcification. DOPPLER: There was no stenosis. There was no significant regurgitation. TRICUSPID VALVE: There was normal leaflet separation. DOPPLER: There was no evidence for tricuspid stenosis. There was trivial regurgitation. PULMONIC VALVE: Not well visualized, but normal Doppler findings. AORTA: The root exhibited normal size. PERICARDIUM: No significant pericardial effusion identified. A pericardial fat pad was present. Impression / Plan:     Diabetes    Hypertension    Dyslipidemia    Atrial fibrillation     Paroxysmal atrial fibrillation: This diagnosis was made on a Holter monitor performed in July, 2014. All electrocardiograms since then have shown sinus rhythm. EKG today showed sinus tachycardia. She was on metoprolol 25 mg BID but discontinued after recent ED visit because of ?? Bradycardia. Not documented  Will try non-selective coreg for BP and tachycardia. Would place holter monitor to check HR variability. ASA for now for stroke prophylaxis. Hypertension:  Her blood pressure today is 142/76 mmHg. She's on Losartan and Nifedipine, which I recommend to continue. Metoprolol DC as mentioned above and will use coreg. She will call us back with BP and HR reading at home. Goal -140 mm Hg. Patient understood. Diabetes: This is being managed by primary care provider. She is not on any oral agent at this time. Goal hemoglobin A1c less than 7 is recommended from cardiovascular standpoint. This is being managed by Dr. Caitlyn Lau. Hyperlipidemia:  Currently she is taking omega 3 fatty acid.   She is not on any statin medication. This is being managed by primary care provider. I would recommend to continue with same medication for now. Other than the above, or unless any additional issues arise, I have asked that she follow up in 2 months.

## 2017-06-02 ENCOUNTER — HOSPITAL ENCOUNTER (OUTPATIENT)
Dept: NON INVASIVE DIAGNOSTICS | Age: 72
Discharge: HOME | End: 2017-06-02
Attending: INTERNAL MEDICINE
Payer: MEDICARE

## 2017-06-02 DIAGNOSIS — I48.0 PAROXYSMAL ATRIAL FIBRILLATION (HCC): ICD-10-CM

## 2017-06-02 PROCEDURE — 93306 TTE W/DOPPLER COMPLETE: CPT

## 2017-06-20 ENCOUNTER — OFFICE VISIT (OUTPATIENT)
Dept: CARDIOLOGY CLINIC | Age: 72
End: 2017-06-20

## 2017-06-20 VITALS
HEART RATE: 92 BPM | BODY MASS INDEX: 58.46 KG/M2 | WEIGHT: 290 LBS | OXYGEN SATURATION: 96 % | SYSTOLIC BLOOD PRESSURE: 109 MMHG | DIASTOLIC BLOOD PRESSURE: 71 MMHG | HEIGHT: 59 IN

## 2017-06-20 DIAGNOSIS — I10 ESSENTIAL HYPERTENSION WITH GOAL BLOOD PRESSURE LESS THAN 140/90: Primary | ICD-10-CM

## 2017-06-20 DIAGNOSIS — I48.0 PAF (PAROXYSMAL ATRIAL FIBRILLATION) (HCC): ICD-10-CM

## 2017-06-20 DIAGNOSIS — E78.00 PURE HYPERCHOLESTEROLEMIA: ICD-10-CM

## 2017-06-20 RX ORDER — INSULIN GLARGINE 100 [IU]/ML
12 INJECTION, SOLUTION SUBCUTANEOUS
COMMUNITY
End: 2018-10-05 | Stop reason: ALTCHOICE

## 2017-06-20 NOTE — PROGRESS NOTES
[facility]       Ms. Hosea Taylor is a 66 y.o. woman with diabetes, hypertension and dyslipidemia. She has paroxysmal atrial fibrillation as noted on Holter monitor in July of 2014. Ms. Hosea Taylor is here today for follow up appointment. She denies any symptoms to suggest angina or heart failure. She denies any palpitations, presyncope or syncope. She takes her medications regularly. Past Medical History:   Diagnosis Date    Anxiety     Atrial fibrillation (HCC)     CHADS score 2  (-CHF, +HTN, -AGE, +DM, -CVA)    COPD     Degenerative spine disease     Diabetes ([address]     Dyslipidemia     Hypertension     Major depression     [facility]bid [facility] [address]     Neuropathic pain     Sleep apnea        Past Surgical History:   Procedure Laterality Date    HX ABDOMINOPLASTY      HX HYSTERECTOMY         Current Outpatient Prescriptions   Medication Sig    insulin glargine (LANTUS) 100 unit/mL injection 12 Units by SubCUTAneous route nightly.  carvedilol (COREG) 3.125 mg tablet TAKE ONE TABLET BY MOUTH TWICE DAILY WITH MEALS    aspirin delayed-release 325 mg tablet TAKE 1 TABLET BY MOUTH DAILY    SYMBICORT 160-4.5 mcg/actuation HFA inhaler Take 1 Puff by inhalation two (2) times a day.  trospium (SANCTURA XL) 60 mg capsule Take 60 mg by mouth Daily (before breakfast).  fluticasone (FLOVENT DISKUS) 50 mcg/actuation inhaler Take  by inhalation.  nystatin-triamcinolone (MYCOLOG II) topical cream Apply  to affected area two (2) times a day.  albuterol (PROAIR HFA) 90 mcg/actuation inhaler Take  by inhalation.  Omega-3-DHA-EPA-Fish Oil 1,000 (120-180) mg cap Take  by mouth.  omeprazole (PRILOSEC) 20 mg capsule Take 20 mg by mouth daily.  losartan (COZAAR) 50 mg tablet Take 50 mg by mouth daily.  buPROPion SR (WELLBUTRIN, ZYBAN) 200 mg SR tablet Take 200 mg by mouth daily.  ergocalciferol (VITAMIN D2) 50,000 unit capsule Take 50,000 Units by mouth every seven (7) days. EVERY 2 WEEKS    sertraline (ZOLOFT) 100 mg tablet Take 100 mg by mouth two (2) times a day.  NIFEdipine ER (PROCARDIA XL) 60 mg ER tablet Take 60 mg by mouth daily.  tolterodine ER (DETROL LA) 4 mg ER capsule Take 4 mg by mouth daily. No current facility-administered medications for this visit. Allergies   Allergen Reactions    Sulfa (Sulfonamide Antibiotics) Hives       Family History:   Non contributory for premature coronary disease in first degree relatives. Social History:   She  reports that she has never smoked. She does not have any smokeless tobacco history on file. She  reports that she does not drink alcohol. Physical:   Vitals:   BP: 109/71  HR: 92  Wt: 290 lb (131.5 kg)    Exam:   GENERAL:  Ms. Braydon Cheney is an older woman without complaints. CHEST:  Clear with good effort. CARDIAC:  Regular  ABDOMEN:  Soft. Bowel sounds present. EXTREMITIES:  No edema. Scattered ecchymoses. CAROTID:  No bruit. NECK:  No JVD    Review of Data:   LABS:   Lab Results   Component Value Date/Time    WBC 6.8 05/08/2017 03:40 PM    HGB 12.3 05/08/2017 03:40 PM    HCT 38.3 05/08/2017 03:40 PM    PLATELET 482 20/76/1127 03:40 PM     Lab Results   Component Value Date/Time    Sodium 141 05/08/2017 03:40 PM    Potassium 4.5 05/08/2017 03:40 PM    Chloride 104 05/08/2017 03:40 PM    CO2 25 05/08/2017 03:40 PM    Glucose 118 05/08/2017 03:40 PM    BUN 24 05/08/2017 03:40 PM    Creatinine 0.91 05/08/2017 03:40 PM     Lab Results   Component Value Date/Time    Cholesterol, total 176 08/17/2010 02:30 PM    HDL Cholesterol 33 08/17/2010 02:30 PM    LDL, calculated 83.6 08/17/2010 02:30 PM    Triglyceride 297 08/17/2010 02:30 PM    CHOL/HDL Ratio 5.3 08/17/2010 02:30 PM     No results found for: GPT  No results found for: HBA1C, [id number]    EKG: July 2014:  double voltage standard. Sinus tachycardia 121 beats per minute. Nonspecific interventricular conduction delay.   Nonspecific ST-T wave changes. HOLTER: July 2014: Interpretation:  1. Rhythm is sinus with intermittent brief run of atrial fibrillation. Patient was in atrial fibrillation for 0.31% of the recording time. 2. MN and QRS are within normal limits. 3. 216 PACs, 34 paired events. HOLTER (05/17)  Basic rhythm is sinus with an average heart rate of 84 bpm. Range was 60 to   124 bpm.  Rare PVCs ( only 1 recorded in 48 hours )  Rare PACs with one 4 beat run of SVT which was felt to be atrial tachycardia.       ECHO:06/17  SUMMARY:  Left ventricle: Systolic function was normal by visual assessment. Ejection fraction was estimated to be 55 %. No obvious wall motion  abnormalities identified in the views obtained. Wall thickness was mildly  increased. Right ventricle: Systolic function was normal.    Mitral valve: There was no evidence for stenosis. There was trivial  regurgitation. Aortic valve: There was no stenosis. There was no regurgitation. Impression / Plan:     Diabetes    Hypertension    Dyslipidemia    Atrial fibrillation     Paroxysmal atrial fibrillation: This diagnosis was made on a Holter monitor performed in July, 2014. All electrocardiograms and holter since then have shown sinus rhythm. Holter in 06/17 showed sinus rhythm with appropriate HR variability. Continue coreg. Discussed about a.fib and stroke prophylaxis. ASA vs. Anticoagulation ( with coumadin / newer agent ). RIsk, benefit, side effects of medications and alternatives were discussed with patient regarding each medications. ASA for now for stroke prophylaxis. Hypertension:  Her blood pressure today is 109/71 mmHg. She's on Losartan and Nifedipine and coreg    Diabetes: This is being managed by primary care provider. She is not on any oral agent at this time. Goal hemoglobin A1c less than 7 is recommended from cardiovascular standpoint. This is being managed by Dr. Danielle Singh.     Hyperlipidemia:  Currently she is taking omega 3 fatty acid.  She is not on any statin medication. This is being managed by primary care provider. Other than the above, or unless any additional issues arise, I have asked that she follow up in 9 months.

## 2017-06-20 NOTE — MR AVS SNAPSHOT
Visit Information Date & Time Provider Department Dept. Phone Encounter #  
 6/20/2017  2:30 PM Rafi Rodrigues MD [address] [place] [address] Specialist at [facility] [phone] [id number] Follow-up Instructions Return in about 9 months (around 3/20/2018). Upcoming Health Maintenance Date Due HEMOGLOBIN A1C Q6M 1945 FOOT EXAM Q1 11/1/1954 MICROALBUMIN Q1 11/1/1954 EYE EXAM RETINAL OR DILATED Q1 11/1/1954 DTaP/Tdap/Td series (1 - Tdap) 11/1/1965 FOBT Q 1 YEAR AGE 50-75 11/1/1994 ZOSTER VACCINE AGE 60> 11/1/2004 GLAUCOMA SCREENING Q2Y 11/1/2009 Pneumococcal 65+ Low/Medium Risk (1 of 2 - PCV13) 11/1/2009 MEDICARE YEARLY EXAM 11/1/2009 LIPID PANEL Q1 8/17/2011 INFLUENZA AGE 9 TO ADULT 8/1/2017 BREAST CANCER SCRN MAMMOGRAM 2/17/2019 Allergies as of 6/20/2017  Review Complete On: 6/20/2017 By: Kyung Goodson LPN Severity Noted Reaction Type Reactions Sulfa (Sulfonamide Antibiotics)  05/14/2013    Hives Current Immunizations  Never Reviewed No immunizations on file. Not reviewed this visit Vitals BP Pulse Height(growth percentile) Weight(growth percentile) SpO2 BMI  
 109/71 92 4' 11\" (1.499 m) 290 lb (131.5 kg) 96% 58.57 kg/m2 OB Status Smoking Status Hysterectomy Never Smoker BMI and BSA Data Body Mass Index Body Surface Area 58.57 kg/m 2 2.34 m 2 Preferred Pharmacy Pharmacy Name Phone [facility] [place], [address] [place] [address] [phone] Your Updated Medication List  
  
   
This list is accurate as of: 6/20/17  2:43 PM.  Always use your most recent med list.  
  
  
  
  
 aspirin delayed-release 325 mg tablet TAKE 1 TABLET BY MOUTH DAILY  
  
 buPROPion  mg SR tablet Commonly known as:  Arvell Dory Take 200 mg by mouth daily. carvedilol 3.125 mg tablet Commonly known as:  COREG  
TAKE ONE TABLET BY MOUTH TWICE DAILY WITH MEALS  
  
 fluticasone 50 mcg/actuation inhaler Commonly known as:  FLOVENT DISKUS Take  by inhalation. LANTUS 100 unit/mL injection Generic drug:  insulin glargine 12 Units by SubCUTAneous route nightly. losartan 50 mg tablet Commonly known as:  COZAAR Take 50 mg by mouth daily. NIFEdipine ER 60 mg ER tablet Commonly known as:  PROCARDIA XL Take 60 mg by mouth daily. nystatin-triamcinolone topical cream  
Commonly known as:  MYCOLOG II Apply  to affected area two (2) times a day. Omega-3-DHA-EPA-Fish Oil 1,000 mg (120 mg-180 mg) Cap Take  by mouth. omeprazole 20 mg capsule Commonly known as:  PRILOSEC Take 20 mg by mouth daily. PROAIR HFA 90 mcg/actuation inhaler Generic drug:  albuterol Take  by inhalation. sertraline 100 mg tablet Commonly known as:  ZOLOFT Take 100 mg by mouth two (2) times a day. SYMBICORT 160-4.5 mcg/actuation HFA inhaler Generic drug:  budesonide-formoterol Take 1 Puff by inhalation two (2) times a day. tolterodine ER 4 mg ER capsule Commonly known as:  Linford Jese Take 4 mg by mouth daily. trospium 60 mg capsule Commonly known as:  SANCTURA XL Take 60 mg by mouth Daily (before breakfast). VITAMIN D2 50,000 unit capsule Generic drug:  ergocalciferol Take 50,000 Units by mouth every seven (7) days. EVERY 2 WEEKS Follow-up Instructions Return in about 9 months (around 3/20/2018). Introducing [facility]! Dear Nasrin Patel: Thank you for requesting a Yoomly account. Our records indicate that you have previously registered for a Yoomly account but its currently inactive. Please call our Yoomly support line at [phone]. Additional Information If you have questions, please visit the Frequently Asked Questions section of the GardenStory website at [email or web address] Remember, GardenStory is NOT to be used for urgent needs. For medical emergencies, dial 911. Now available from your iPhone and Android! Please provide this summary of care documentation to your next provider. Your primary care clinician is listed as Kristi Akins. If you have any questions after today's visit, please call [phone].

## 2017-06-20 NOTE — LETTER
Patient:  Adelso Mccullough YOB: 1945 Date of Visit: 6/20/2017 Dear Chelsea Cordova MD 
[id number] [facility] [id number] VIA Facsimile: [phone] 
 : 
 
 
 
 
     [facility] Ms. Agustin Leon is a 66 y.o. woman with diabetes, hypertension and dyslipidemia. She has paroxysmal atrial fibrillation as noted on Holter monitor in July of 2014. Ms. Agustin Leon is here today for follow up appointment. She denies any symptoms to suggest angina or heart failure. She denies any palpitations, presyncope or syncope. She takes her medications regularly. Past Medical History:  
Diagnosis Date  Anxiety  Atrial fibrillation ([address] CHADS score 2  (-CHF, +HTN, -AGE, +DM, -CVA)  COPD  Degenerative spine disease  Diabetes ([address]  Dyslipidemia  Hypertension  Major depression  Morbid obesity ([address]  Neuropathic pain  Sleep apnea Past Surgical History:  
Procedure Laterality Date  HX ABDOMINOPLASTY  HX HYSTERECTOMY Current Outpatient Prescriptions Medication Sig  
 insulin glargine (LANTUS) 100 unit/mL injection 12 Units by SubCUTAneous route nightly.  carvedilol (COREG) 3.125 mg tablet TAKE ONE TABLET BY MOUTH TWICE DAILY WITH MEALS  
 aspirin delayed-release 325 mg tablet TAKE 1 TABLET BY MOUTH DAILY  SYMBICORT 160-4.5 mcg/actuation HFA inhaler Take 1 Puff by inhalation two (2) times a day.  trospium (SANCTURA XL) 60 mg capsule Take 60 mg by mouth Daily (before breakfast).  fluticasone (FLOVENT DISKUS) 50 mcg/actuation inhaler Take  by inhalation.  nystatin-triamcinolone (MYCOLOG II) topical cream Apply  to affected area two (2) times a day.  albuterol (PROAIR HFA) 90 mcg/actuation inhaler Take  by inhalation.  Omega-3-DHA-EPA-Fish Oil 1,000 (120-180) mg cap Take  by mouth.  omeprazole (PRILOSEC) 20 mg capsule Take 20 mg by mouth daily.  losartan (COZAAR) 50 mg tablet Take 50 mg by mouth daily.  buPROPion SR (WELLBUTRIN, ZYBAN) 200 mg SR tablet Take 200 mg by mouth daily.  ergocalciferol (VITAMIN D2) 50,000 unit capsule Take 50,000 Units by mouth every seven (7) days. EVERY 2 WEEKS  sertraline (ZOLOFT) 100 mg tablet Take 100 mg by mouth two (2) times a day.  NIFEdipine ER (PROCARDIA XL) 60 mg ER tablet Take 60 mg by mouth daily.  tolterodine ER (DETROL LA) 4 mg ER capsule Take 4 mg by mouth daily. No current facility-administered medications for this visit. Allergies Allergen Reactions  Sulfa (Sulfonamide Antibiotics) Hives Family History:  
Non contributory for premature coronary disease in first degree relatives. Social History: She  reports that she has never smoked. She does not have any smokeless tobacco history on file. She  reports that she does not drink alcohol. Physical:  
Vitals:  
BP: 109/71 HR: 92 Wt: 290 lb (131.5 kg) Exam:  
GENERAL:  Ms. Victor Hugo Steel is an older woman without complaints. CHEST:  Clear with good effort. CARDIAC:  Regular ABDOMEN:  Soft. Bowel sounds present. EXTREMITIES:  No edema. Scattered ecchymoses. CAROTID:  No bruit. NECK:  No JVD Review of Data:  
LABS:  
Lab Results Component Value Date/Time WBC 6.8 05/08/2017 03:40 PM  
 HGB 12.3 05/08/2017 03:40 PM  
 HCT 38.3 05/08/2017 03:40 PM  
 PLATELET 936 00/69/1379 03:40 PM  
 
Lab Results Component Value Date/Time Sodium 141 05/08/2017 03:40 PM  
 Potassium 4.5 05/08/2017 03:40 PM  
 Chloride 104 05/08/2017 03:40 PM  
 CO2 25 05/08/2017 03:40 PM  
 Glucose 118 05/08/2017 03:40 PM  
 BUN 24 05/08/2017 03:40 PM  
 Creatinine 0.91 05/08/2017 03:40 PM  
 
Lab Results Component Value Date/Time  Cholesterol, total 176 08/17/2010 02:30 PM  
 HDL Cholesterol 33 08/17/2010 02:30 PM  
 LDL, calculated 83.6 08/17/2010 02:30 PM  
 Triglyceride 297 08/17/2010 02:30 PM  
 CHOL/HDL Ratio 5.3 08/17/2010 02:30 PM  
 
No results found for: GPT No results found for: HBA1C, [id number] 
 
EKG: July 2014: 
double voltage standard. Sinus tachycardia 121 beats per minute. Nonspecific interventricular conduction delay. Nonspecific ST-T wave changes. HOLTER: July 2014: Interpretation: 1. Rhythm is sinus with intermittent brief run of atrial fibrillation. Patient was in atrial fibrillation for 0.31% of the recording time. 2. AR and QRS are within normal limits. 3. 216 PACs, 34 paired events. HOLTER (05/17) Basic rhythm is sinus with an average heart rate of 84 bpm. Range was 60 to  
124 bpm. 
Rare PVCs ( only 1 recorded in 48 hours ) Rare PACs with one 4 beat run of SVT which was felt to be atrial tachycardia. 
  
 
ECHO:06/17 SUMMARY: 
Left ventricle: Systolic function was normal by visual assessment. Ejection fraction was estimated to be 55 %. No obvious wall motion 
abnormalities identified in the views obtained. Wall thickness was mildly 
increased. Right ventricle: Systolic function was normal. 
 
Mitral valve: There was no evidence for stenosis. There was trivial 
regurgitation. Aortic valve: There was no stenosis. There was no regurgitation. Impression / Plan:  Diabetes  Hypertension  Dyslipidemia  Atrial fibrillation Paroxysmal atrial fibrillation: This diagnosis was made on a Holter monitor performed in July, 2014. All electrocardiograms and holter since then have shown sinus rhythm. Holter in 06/17 showed sinus rhythm with appropriate HR variability. Continue coreg. Discussed about a.fib and stroke prophylaxis. ASA vs. Anticoagulation ( with coumadin / newer agent ). RIsk, benefit, side effects of medications and alternatives were discussed with patient regarding each medications. ASA for now for stroke prophylaxis. Hypertension:  Her blood pressure today is 109/71 mmHg. She's on Losartan and Nifedipine and coreg Diabetes: This is being managed by primary care provider. She is not on any oral agent at this time. Goal hemoglobin A1c less than 7 is recommended from cardiovascular standpoint. This is being managed by Dr. Ebony Lainez. Hyperlipidemia:  Currently she is taking omega 3 fatty acid. She is not on any statin medication. This is being managed by primary care provider. Other than the above, or unless any additional issues arise, I have asked that she follow up in 9 months. Sincerely, Matti Denny MD

## 2017-08-15 NOTE — COMMUNICATION BODY
[facility]       Ms. Sudeep Pereira is a 66 y.o. woman with diabetes, hypertension and dyslipidemia. She has paroxysmal atrial fibrillation as noted on Holter monitor in July of 2014. Ms. Sudeep Pereira is here today for follow up appointment. She denies any symptoms to suggest angina or heart failure. She denies any palpitations, presyncope or syncope. She takes her medications regularly. Past Medical History:   Diagnosis Date    Anxiety     Atrial fibrillation (H[facility])     CHADS score 2  (-CHF, +HTN, -AGE, +DM, -CVA)    COPD     Degenerative spine disease     Diabetes ([address]     Dyslipidemia     Hypertension     Major depression     Morbid [facility] ([facility]     Neuropathic pain     Sleep apnea        Past Surgical History:   Procedure Laterality Date    HX ABDOMINOPLASTY      HX HYSTERECTOMY         Current Outpatient Prescriptions   Medication Sig    insulin glargine (LANTUS) 100 unit/mL injection 12 Units by SubCUTAneous route nightly.  carvedilol (COREG) 3.125 mg tablet TAKE ONE TABLET BY MOUTH TWICE DAILY WITH MEALS    aspirin delayed-release 325 mg tablet TAKE 1 TABLET BY MOUTH DAILY    SYMBICORT 160-4.5 mcg/actuation HFA inhaler Take 1 Puff by inhalation two (2) times a day.  trospium (SANCTURA XL) 60 mg capsule Take 60 mg by mouth Daily (before breakfast).  fluticasone (FLOVENT DISKUS) 50 mcg/actuation inhaler Take  by inhalation.  nystatin-triamcinolone (MYCOLOG II) topical cream Apply  to affected area two (2) times a day.  albuterol (PROAIR HFA) 90 mcg/actuation inhaler Take  by inhalation.  Omega-3-DHA-EPA-Fish Oil 1,000 (120-180) mg cap Take  by mouth.  omeprazole (PRILOSEC) 20 mg capsule Take 20 mg by mouth daily.  losartan (COZAAR) 50 mg tablet Take 50 mg by mouth daily.  buPROPion SR (WELLBUTRIN, ZYBAN) 200 mg SR tablet Take 200 mg by mouth daily.  ergocalciferol (VITAMIN D2) 50,000 unit capsule Take 50,000 Units by mouth every seven (7) days. EVERY 2 WEEKS    sertraline (ZOLOFT) 100 mg tablet Take 100 mg by mouth two (2) times a day.  NIFEdipine ER (PROCARDIA XL) 60 mg ER tablet Take 60 mg by mouth daily.  tolterodine ER (DETROL LA) 4 mg ER capsule Take 4 mg by mouth daily. No current facility-administered medications for this visit. Allergies   Allergen Reactions    Sulfa (Sulfonamide Antibiotics) Hives       Family History:   Non contributory for premature coronary disease in first degree relatives. Social History:   She  reports that she has never smoked. She does not have any smokeless tobacco history on file. She  reports that she does not drink alcohol. Physical:   Vitals:   BP: 109/71  HR: 92  Wt: 290 lb (131.5 kg)    Exam:   GENERAL:  Ms. Hubert Copeland is an older woman without complaints. CHEST:  Clear with good effort. CARDIAC:  Regular  ABDOMEN:  Soft. Bowel sounds present. EXTREMITIES:  No edema. Scattered ecchymoses. CAROTID:  No bruit. NECK:  No JVD    Review of Data:   LABS:   Lab Results   Component Value Date/Time    WBC 6.8 05/08/2017 03:40 PM    HGB 12.3 05/08/2017 03:40 PM    HCT 38.3 05/08/2017 03:40 PM    PLATELET 125 18/35/2748 03:40 PM     Lab Results   Component Value Date/Time    Sodium 141 05/08/2017 03:40 PM    Potassium 4.5 05/08/2017 03:40 PM    Chloride 104 05/08/2017 03:40 PM    CO2 25 05/08/2017 03:40 PM    Glucose 118 05/08/2017 03:40 PM    BUN 24 05/08/2017 03:40 PM    Creatinine 0.91 05/08/2017 03:40 PM     Lab Results   Component Value Date/Time    Cholesterol, total 176 08/17/2010 02:30 PM    HDL Cholesterol 33 08/17/2010 02:30 PM    LDL, calculated 83.6 08/17/2010 02:30 PM    Triglyceride 297 08/17/2010 02:30 PM    CHOL/HDL Ratio 5.3 08/17/2010 02:30 PM     No results found for: GPT  No results found for: HBA1C, [id number]    EKG: July 2014:  double voltage standard. Sinus tachycardia 121 beats per minute. Nonspecific interventricular conduction delay.   Nonspecific ST-T wave changes. HOLTER: July 2014: Interpretation:  1. Rhythm is sinus with intermittent brief run of atrial fibrillation. Patient was in atrial fibrillation for 0.31% of the recording time. 2. KS and QRS are within normal limits. 3. 216 PACs, 34 paired events. HOLTER (05/17)  Basic rhythm is sinus with an average heart rate of 84 bpm. Range was 60 to   124 bpm.  Rare PVCs ( only 1 recorded in 48 hours )  Rare PACs with one 4 beat run of SVT which was felt to be atrial tachycardia.       ECHO:06/17  SUMMARY:  Left ventricle: Systolic function was normal by visual assessment. Ejection fraction was estimated to be 55 %. No obvious wall motion  abnormalities identified in the views obtained. Wall thickness was mildly  increased. Right ventricle: Systolic function was normal.    Mitral valve: There was no evidence for stenosis. There was trivial  regurgitation. Aortic valve: There was no stenosis. There was no regurgitation. Impression / Plan:     Diabetes    Hypertension    Dyslipidemia    Atrial fibrillation     Paroxysmal atrial fibrillation: This diagnosis was made on a Holter monitor performed in July, 2014. All electrocardiograms and holter since then have shown sinus rhythm. Holter in 06/17 showed sinus rhythm with appropriate HR variability. Continue coreg. Discussed about a.fib and stroke prophylaxis. ASA vs. Anticoagulation ( with coumadin / newer agent ). RIsk, benefit, side effects of medications and alternatives were discussed with patient regarding each medications. ASA for now for stroke prophylaxis. Hypertension:  Her blood pressure today is 109/71 mmHg. She's on Losartan and Nifedipine and coreg    Diabetes: This is being managed by primary care provider. She is not on any oral agent at this time. Goal hemoglobin A1c less than 7 is recommended from cardiovascular standpoint. This is being managed by Dr. Rebecca Cuello.     Hyperlipidemia:  Currently she is taking omega 3 fatty acid.  She is not on any statin medication. This is being managed by primary care provider. Other than the above, or unless any additional issues arise, I have asked that she follow up in 9 months.

## 2018-01-15 ENCOUNTER — HOSPITAL ENCOUNTER (OUTPATIENT)
Dept: GENERAL RADIOLOGY | Age: 73
Discharge: HOME | End: 2018-01-15
Attending: FAMILY MEDICINE
Payer: MEDICARE

## 2018-01-15 DIAGNOSIS — M25.531 RIGHT WRIST PAIN: ICD-10-CM

## 2018-01-15 PROCEDURE — 73100 X-RAY EXAM OF WRIST: CPT

## 2018-01-31 ENCOUNTER — HOSPITAL ENCOUNTER (EMERGENCY)
Age: 73
Discharge: HOME | End: 2018-01-31
Attending: EMERGENCY MEDICINE
Payer: MEDICARE

## 2018-01-31 VITALS
TEMPERATURE: 98.1 F | RESPIRATION RATE: 16 BRPM | OXYGEN SATURATION: 98 % | SYSTOLIC BLOOD PRESSURE: 145 MMHG | WEIGHT: 288 LBS | DIASTOLIC BLOOD PRESSURE: 74 MMHG | HEART RATE: 97 BPM | BODY MASS INDEX: 58.17 KG/M2

## 2018-01-31 DIAGNOSIS — G89.29 OTHER CHRONIC PAIN: Primary | ICD-10-CM

## 2018-01-31 PROCEDURE — 99283 EMERGENCY DEPT VISIT LOW MDM: CPT

## 2018-01-31 PROCEDURE — 74011250637 HC RX REV CODE- 250/637: Performed by: EMERGENCY MEDICINE

## 2018-01-31 RX ORDER — HYDROCODONE BITARTRATE AND ACETAMINOPHEN 5; 325 MG/1; MG/1
1 TABLET ORAL
Status: COMPLETED | OUTPATIENT
Start: 2018-01-31 | End: 2018-01-31

## 2018-01-31 RX ORDER — HYDROCODONE BITARTRATE AND ACETAMINOPHEN 5; 325 MG/1; MG/1
TABLET ORAL
Qty: 6 TAB | Refills: 0 | Status: SHIPPED | OUTPATIENT
Start: 2018-01-31 | End: 2019-12-12 | Stop reason: ALTCHOICE

## 2018-01-31 RX ADMIN — HYDROCODONE BITARTRATE AND ACETAMINOPHEN 1 TABLET: 5; 325 TABLET ORAL at 20:51

## 2018-02-01 NOTE — DISCHARGE INSTRUCTIONS
SPECIFIC PATIENT INSTRUCTIONS FROM THE PHYSICIAN WHO TREATED YOU IN THE ER TODAY:  1. Return if any concerns or worsening of condition(s)  2. Take your PREVIOUSLY prescribed pain medication for pain. 3. If you have PREVIOUSLY prescribed nausea/vomiting medication or if you were prescribed Zofran in the emergency department today, then take that as prescribed for nausea and/or vomiting. 3. Follow up with your primary doctor in the next 2-4 days for reevaluation. IF you have a pain management doctor, then follow up with them as well in 2-4 days for reevaluation. Chronic Pain: Care Instructions  Your Care Instructions    Chronic pain is pain that lasts a long time (months or even years) and may or may not have a clear cause. It is different from acute pain, which usually does have a clear cause-like an injury or illness-and gets better over time. Chronic pain:  · Lasts over time but may vary from day to day. · Does not go away despite efforts to end it. · May disrupt your sleep and lead to fatigue. · May cause depression or anxiety. · May make your muscles tense, causing more pain. · Can disrupt your work, hobbies, home life, and relationships with friends and family. Chronic pain is a very real condition. It is not just in your head. Treatment can help and usually includes several methods used together, such as medicines, physical therapy, exercise, and other treatments. Learning how to relax and changing negative thought patterns can also help you cope. Chronic pain is complex. Taking an active role in your treatment will help you better manage your pain. Tell your doctor if you have trouble dealing with your pain. You may have to try several things before you find what works best for you. Follow-up care is a key part of your treatment and safety. Be sure to make and go to all appointments, and call your doctor if you are having problems.  It's also a good idea to know your test results and keep a list of the medicines you take. How can you care for yourself at home? · Pace yourself. Break up large jobs into smaller tasks. Save harder tasks for days when you have less pain, or go back and forth between hard tasks and easier ones. Take rest breaks. · Relax, and reduce stress. Relaxation techniques such as deep breathing or meditation can help. · Keep moving. Gentle, daily exercise can help reduce pain over the long run. Try low- or no-impact exercises such as walking, swimming, and stationary biking. Do stretches to stay flexible. · Try heat, cold packs, and massage. · Get enough sleep. Chronic pain can make you tired and drain your energy. Talk with your doctor if you have trouble sleeping because of pain. · Think positive. Your thoughts can affect your pain level. Do things that you enjoy to distract yourself when you have pain instead of focusing on the pain. See a movie, read a book, listen to music, or spend time with a friend. · If you think you are depressed, talk to your doctor about treatment. · Keep a daily pain diary. Record how your moods, thoughts, sleep patterns, activities, and medicine affect your pain. You may find that your pain is worse during or after certain activities or when you are feeling a certain emotion. Having a record of your pain can help you and your doctor find the best ways to treat your pain. · Take pain medicines exactly as directed. ¨ If the doctor gave you a prescription medicine for pain, take it as prescribed. ¨ If you are not taking a prescription pain medicine, ask your doctor if you can take an over-the-counter medicine. Reducing constipation caused by pain medicine  · Include fruits, vegetables, beans, and whole grains in your diet each day. These foods are high in fiber. · Drink plenty of fluids, enough so that your urine is light yellow or clear like water.  If you have kidney, heart, or liver disease and have to limit fluids, talk with your doctor before you increase the amount of fluids you drink. · If your doctor recommends it, get more exercise. Walking is a good choice. Bit by bit, increase the amount you walk every day. Try for at least 30 minutes on most days of the week. · Schedule time each day for a bowel movement. A daily routine may help. Take your time and do not strain when having a bowel movement. When should you call for help? Call your doctor now or seek immediate medical care if:  ? · Your pain gets worse or is out of control. ? · You feel down or blue, or you do not enjoy things like you once did. You may be depressed, which is common in people with chronic pain. Depression can be treated. ? · You have vomiting or cramps for more than 2 hours. ? Watch closely for changes in your health, and be sure to contact your doctor if:  ? · You cannot sleep because of pain. ? · You are very worried or anxious about your pain. ? · You have trouble taking your pain medicine. ? · You have any concerns about your pain medicine. ? · You have trouble with bowel movements, such as:  ¨ No bowel movement in 3 days. ¨ Blood in the anal area, in your stool, or on the toilet paper. ¨ Diarrhea for more than 24 hours. Where can you learn more? Go to http://gokul-mack.info/. Enter N004 in the search box to learn more about \"Chronic Pain: Care Instructions. \"  Current as of: October 14, 2016  Content Version: 11.4  © 0884-5830 LocalCustomer. Care instructions adapted under license by [employer] (which disclaims liability or warranty for this information). If you have questions about a medical condition or this instruction, always ask your healthcare professional. [place] [address] any warranty or liability for your use of this information. MyChart Activation    Thank you for requesting access to [email or web address].  Please follow the instructions below to securely access and download your online medical record. Evergram allows you to send messages to your doctor, view your test results, renew your prescriptions, schedule appointments, and more. How Do I Sign Up? 1. In your internet browser, go to [email or web address]. 2. Click on the First Time User? Click Here link in the Sign In box. You will see the New Member Sign Up page. 3. Enter your Evergram Access Code exactly as it appears below. You will not need to use this code after youve completed the sign-up process. If you do not sign up before the expiration date, you must request a new code. Evergram Access Code: [email or web address]  Expires: 2018  8:20 PM (This is the date your Evergram access code will )    4. Enter the last four digits of your Social Security Number (xxxx) and Date of Birth (mm/dd/yyyy) as indicated and click Submit. You will be taken to the next sign-up page. 5. Create a Evergram ID. This will be your Evergram login ID and cannot be changed, so think of one that is secure and easy to remember. 6. Create a Evergram password. You can change your password at any time. 7. Enter your Password Reset Question and Answer. This can be used at a later time if you forget your password. 8. Enter your e-mail address. You will receive e-mail notification when new information is available in [address] 9. Click Sign Up. You can now view and download portions of your medical record. 10. Click the Download Summary menu link to download a portable copy of your medical information. Additional Information    If you have questions, please visit the Frequently Asked Questions section of the Evergram website at [email or web address] Remember, Evergram is NOT to be used for urgent needs. For medical emergencies, dial 911.

## 2018-02-01 NOTE — ED PROVIDER NOTES
[facility] EMERGENCY DEPT      8:16 PM    Date: 1/31/2018  Patient Name: Brandyn Patel    History of Presenting Illness     Chief Complaint   Patient presents with    Wrist Pain       History Provided By: Patient    Chief Complaint: Wrist pain  Duration:  Days  Timing:  Worsening  Location: Right wrist  Quality: Aching  Severity: Severe  Modifying Factors: None   Associated Symptoms: denies any other associated signs or symptoms    67 y.o. female with noted past medical history who presents to the emergency department complaining of severe worse than usual chronic right wrist pain x 3-4 days. The patient states that she had an x-ray 3 weeks ago that that showed osteoarthritis. She reports no recent injury or falls. She reports taking Gabapentin at home for her pain. No other complaints. Nursing nurses regarding the HPI and triage nursing notes were reviewed. Prior medical records were reviewed. Current Facility-Administered Medications   Medication Dose Route Frequency Provider Last Rate Last Dose    HYDROcodone-acetaminophen (NORCO) 5-325 mg per tablet 1 Tab  1 Tab Oral NOW Cristal Wynne MD         Current Outpatient Prescriptions   Medication Sig Dispense Refill    HYDROcodone-acetaminophen (NORCO) 5-325 mg per tablet Take 1-2 tablets PO every 4-6 hours as needed for pain control. If over the counter ibuprofen or acetaminophen was suggested, then only take the vicodin for pain not well controlled with the over the counter medication. 6 Tab 0    insulin glargine (LANTUS) 100 unit/mL injection 12 Units by SubCUTAneous route nightly.  carvedilol (COREG) 3.125 mg tablet TAKE ONE TABLET BY MOUTH TWICE DAILY WITH MEALS 180 Tab 6    aspirin delayed-release 325 mg tablet TAKE 1 TABLET BY MOUTH DAILY 90 Tab 3    SYMBICORT 160-4.5 mcg/actuation HFA inhaler Take 1 Puff by inhalation two (2) times a day.   3    trospium (SANCTURA XL) 60 mg capsule Take 60 mg by mouth Daily (before breakfast).  fluticasone (FLOVENT DISKUS) 50 mcg/actuation inhaler Take  by inhalation.  nystatin-triamcinolone (MYCOLOG II) topical cream Apply  to affected area two (2) times a day.  albuterol (PROAIR HFA) 90 mcg/actuation inhaler Take  by inhalation.  Omega-3-DHA-EPA-Fish Oil 1,000 (120-180) mg cap Take  by mouth.  omeprazole (PRILOSEC) 20 mg capsule Take 20 mg by mouth daily.  losartan (COZAAR) 50 mg tablet Take 50 mg by mouth daily.  buPROPion SR (WELLBUTRIN, ZYBAN) 200 mg SR tablet Take 200 mg by mouth daily.  ergocalciferol (VITAMIN D2) 50,000 unit capsule Take 50,000 Units by mouth every seven (7) days. EVERY 2 WEEKS      sertraline (ZOLOFT) 100 mg tablet Take 100 mg by mouth two (2) times a day.  NIFEdipine ER (PROCARDIA XL) 60 mg ER tablet Take 60 mg by mouth daily.  tolterodine ER (DETROL LA) 4 mg ER capsule Take 4 mg by mouth daily. Past History     Past Medical History:  Past Medical History:   Diagnosis Date    Anxiety     Atrial fibrillation (HCC)     CHADS score 2  (-CHF, +HTN, -AGE, +DM, -CVA)    COPD     Degenerative spine disease     Diabetes ([address]     Dyslipidemia     Hypertension     Major depression     Morbid obesity ([address]     Neuropathic pain     Sleep apnea        Past Surgical History:  Past Surgical History:   Procedure Laterality Date    HX ABDOMINOPLASTY      HX HYSTERECTOMY         Family History:  History reviewed. No pertinent family history. Social History:  Social History   Substance Use Topics    Smoking status: Never Smoker    Smokeless tobacco: None    Alcohol use No       Allergies: Allergies   Allergen Reactions    Sulfa (Sulfonamide Antibiotics) Hives       Patient's primary care provider (as noted in EPIC):  Mathew Kelley MD    Review of Systems     Review of Systems   Musculoskeletal: Positive for arthralgias (right wrist ). All other systems reviewed and are negative.       Physical Exam Visit Vitals    /74    Pulse 97    Temp 98.1 °F (36.7 °C)    Resp 16    Wt 130.6 kg (288 lb)    SpO2 98%    BMI 58.17 kg/m2       PHYSICAL EXAM:    CONSTITUTIONAL:  Alert, in no apparent distress;  well developed;  well nourished. HEAD:  Normocephalic, atraumatic. EYES:  EOMI. Non-icteric sclera. Normal conjunctiva. ENTM:  Nose:  no rhinorrhea. Throat:  no erythema or exudate, mucous membranes moist.  NECK:  No JVD. Supple  RESPIRATORY:  Chest clear, equal breath sounds, good air movement. CARDIOVASCULAR:  Regular rate and rhythm. No murmurs, rubs, or gallops. GI:  Normal bowel sounds, abdomen soft and non-tender. No rebound or guarding. BACK:  Non-tender. UPPER EXT:  Normal inspection. LOWER EXT:  No edema, no calf tenderness. Distal pulses intact. NEURO:  Moves all four extremities, and grossly normal motor exam.  SKIN:  No rashes;  Normal for age. PSYCH:  Alert and normal affect. Area of chronic pain:  Right wrist has focal mild TTP. No rash, lesions, bruising. No fluctuance/ abscess. Normal PROM. Normal distal sensation. DIFFERENTIAL DIAGNOSES/ MEDICAL DECISION MAKING:  Underlying question is whether this is the patient's usual chronic pain presentation versus a new process. Labs and testing were ordered, if appropriate, to rule out a new acute process in addition to patient's chronic pain. Diagnostic Study Results     Abnormal lab results from this emergency department encounter:  Labs Reviewed - No data to display    Lab values for this patient within approximately the last 12 hours:  No results found for this or any previous visit (from the past 12 hour(s)). Radiologist and cardiologist interpretations if available at time of this note:  No results found.     Medication(s) ordered for patient during this emergency visit encounter:  Medications   HYDROcodone-acetaminophen (NORCO) 5-325 mg per tablet 1 Tab (not administered)       Medical Decision Making I am the first provider for this patient. I reviewed the vital signs, available nursing notes, past medical history, past surgical history, family history and social history. Vital Signs:  Reviewed the patient's vital signs. ED COURSE:  The patient's presentation is consistent with an acute exacerbation of the patient's chronic pain and associated symptoms. IMPRESSION AND MEDICAL DECISION MAKING:  Based upon the patient's presentation with noted HPI and PE, along with the work   up done in the emergency department, I believe that the patient is having an exacerbation of patient's chronic pain. However, I do believe that the patient is stable and can be discharged home with further outpatient evaluation of the abdominal pain by the patient's primary doctor. DIAGNOSIS:  1. Acute exacerbation of chronic pain. SPECIFIC PATIENT INSTRUCTIONS FROM THE PHYSICIAN WHO TREATED YOU IN THE ER TODAY:  1. Return if any concerns or worsening of condition(s)  2. Take your PREVIOUSLY prescribed pain medication for pain. 3. If you have PREVIOUSLY prescribed nausea/vomiting medication or if you were prescribed Zofran in the emergency department today, then take that as prescribed for nausea and/or vomiting. 3. Follow up with your primary doctor in the next 2-4 days for reevaluation. IF you have a pain management doctor, then follow up with them as well in 2-4 days for reevaluation. Chronic Pain Management    We care about your pain and want what is best for you. Management of chronic pain is a carefully researched science and this protocol was developed using that research. If you have a chronic pain syndrome (chronic headache, back or neck pain, toothache, abdominal or pelvis pain without a specific diagnosis, or neuropathic pain such as fibromyalgia) or recurrent visits for the same condition without a specific diagnosis, you may be treated with one or more of the following medications. Either intravenous or intramuscular, or by mouth: Anti-inflammatory medication, Toradol, Nubain, Bentyl, Phenergan, Compazine, Haldol, Vistaril, Ultram, Fioricet, Fiorinal.    We will be unable to PRESCRIBE opioid/ narcotic medication(s) or only a limited number of opioid/ narcotic tablets. Some of these medications can impair your ability to drive, making you a danger to yourself and others. Therefore you must have a  present in the Emergency Department (ED) in order to receive those medications during your emergency department visit. Due to rebound pain and the addictive nature of narcotics, you should receive these medications from only one source, such as your Primary Care Physician (PCP), or the specialist managing your chronic pain. We are unable to refill your narcotic medication. Likewise, if you have received narcotics from more than one source in the last 2 months for any of the above stated conditions, we cannot provide you with a refill or different narcotic medication. If you have a chronic pain syndrome managed by your doctor, you should have provisions for break-through pain. It is your responsibility to avoid running out of your medications. If you have a crisis, you should contact your physician and if he/she instructs you to come to the ED, have them call ahead and speak to our emergency department physician concerning your care. This protocol has been created to better serve you as the patient and create consistent care among physicians. Patient is improved, resting quietly and comfortably. The patient will be discharged home. The patient was reassured that these symptoms do not appear to represent a serious or life threatening condition at this time. Warning signs of worsening condition were discussed and understood by the patient.      Based on patient's age, coexisting illness, exam, and the results of this ED evaluation, the decision to treat as an outpatient was made. Based on the information available at time of discharge, acute pathology requiring immediate intervention was deemed relative unlikely. While it is impossible to completely exclude the possibility of underlying serious disease or worsening of condition, I feel the relative likelihood is extremely low. I discussed this uncertainty with the patient, who understood ED evaluation and treatment and felt comfortable with the outpatient treatment plan. All questions regarding care, test results, and follow up were answered. The patient is stable and appropriate to discharge. They understand that they should return to the emergency department for any new or worsening symptoms. I stressed the importance of follow up for repeat assessment and possibly further evaluation/treatment. Coding Diagnoses     Clinical Impression:   1. Other chronic pain        Disposition     Disposition:  Home. Zora Hutchins M.D. KEMAL Board Certified Emergency Physician    Provider Attestation:  If a scribe was utilized in generation of this patient record, I personally performed the services described in the documentation, reviewed the documentation, as recorded by the scribe in my presence, and it accurately records the patient's history of presenting illness, review of systems, patient physical examination, and procedures performed by me as the attending physician. GERALDINE Villalobos Board Certified Emergency Physician  1/31/2018.  8:18 PM    Scribe [id number] [address] acting as a scribe for and in the presence of Olinda Vanegas MD      January 31, 2018 at [facility] PM       Provider Attestation:      I personally performed the services described in the documentation, reviewed the documentation, as recorded by the scribe in my presence, and it accurately and completely records my words and actions.  January 31, 2018 at 8:20 PM - Olinda Vanegas MD

## 2018-02-28 ENCOUNTER — HOSPITAL ENCOUNTER (OUTPATIENT)
Dept: MAMMOGRAPHY | Age: 73
Discharge: HOME | End: 2018-02-28
Attending: FAMILY MEDICINE
Payer: MEDICARE

## 2018-02-28 ENCOUNTER — HOSPITAL ENCOUNTER (OUTPATIENT)
Dept: GENERAL RADIOLOGY | Age: 73
Discharge: HOME | End: 2018-02-28
Attending: FAMILY MEDICINE
Payer: MEDICARE

## 2018-02-28 DIAGNOSIS — Z12.39 BREAST CANCER SCREENING: ICD-10-CM

## 2018-02-28 DIAGNOSIS — M85.9 DISORDER OF BONE DENSITY AND STRUCTURE, UNSPECIFIED: ICD-10-CM

## 2018-02-28 PROCEDURE — 77063 BREAST TOMOSYNTHESIS BI: CPT

## 2018-02-28 PROCEDURE — 77080 DXA BONE DENSITY AXIAL: CPT

## 2018-03-13 RX ORDER — ASPIRIN 325 MG
TABLET, DELAYED RELEASE (ENTERIC COATED) ORAL
Qty: 90 TAB | Refills: 0 | Status: SHIPPED | OUTPATIENT
Start: 2018-03-13 | End: 2018-06-19 | Stop reason: SDUPTHER

## 2018-06-20 RX ORDER — ASPIRIN 325 MG
TABLET, DELAYED RELEASE (ENTERIC COATED) ORAL
Qty: 90 TAB | Refills: 0 | Status: SHIPPED | OUTPATIENT
Start: 2018-06-20 | End: 2018-09-17 | Stop reason: SDUPTHER

## 2018-06-20 RX ORDER — CARVEDILOL 3.12 MG/1
TABLET ORAL
Qty: 180 TAB | Refills: 0 | Status: SHIPPED | OUTPATIENT
Start: 2018-06-20 | End: 2018-09-17 | Stop reason: SDUPTHER

## 2018-09-18 RX ORDER — CARVEDILOL 3.12 MG/1
TABLET ORAL
Qty: 180 TAB | Refills: 0 | Status: SHIPPED | OUTPATIENT
Start: 2018-09-18 | End: 2018-12-04 | Stop reason: SDUPTHER

## 2018-09-18 RX ORDER — ASPIRIN 325 MG
TABLET, DELAYED RELEASE (ENTERIC COATED) ORAL
Qty: 90 TAB | Refills: 0 | Status: SHIPPED | OUTPATIENT
Start: 2018-09-18 | End: 2018-12-18 | Stop reason: SDUPTHER

## 2018-10-05 ENCOUNTER — OFFICE VISIT (OUTPATIENT)
Dept: CARDIOLOGY CLINIC | Age: 73
End: 2018-10-05

## 2018-10-05 VITALS
HEART RATE: 74 BPM | DIASTOLIC BLOOD PRESSURE: 62 MMHG | SYSTOLIC BLOOD PRESSURE: 132 MMHG | WEIGHT: 290 LBS | BODY MASS INDEX: 58.57 KG/M2 | OXYGEN SATURATION: 96 %

## 2018-10-05 DIAGNOSIS — E78.00 PURE HYPERCHOLESTEROLEMIA: ICD-10-CM

## 2018-10-05 DIAGNOSIS — I10 ESSENTIAL HYPERTENSION WITH GOAL BLOOD PRESSURE LESS THAN 140/90: Primary | ICD-10-CM

## 2018-10-05 DIAGNOSIS — I48.0 PAF (PAROXYSMAL ATRIAL FIBRILLATION) (HCC): ICD-10-CM

## 2018-10-05 RX ORDER — INSULIN GLARGINE 100 [IU]/ML
INJECTION, SOLUTION SUBCUTANEOUS
Refills: 1 | COMMUNITY
Start: 2018-09-04

## 2018-10-05 RX ORDER — CYANOCOBALAMIN 1000 UG/ML
INJECTION, SOLUTION INTRAMUSCULAR; SUBCUTANEOUS
Refills: 1 | COMMUNITY
Start: 2018-08-26

## 2018-10-05 NOTE — PROGRESS NOTES
1. Have you been to the ER, urgent care clinic since your last visit? Hospitalized since your last visit? Yes [facility] 
 
2. Have you seen or consulted any other health care providers outside of the [address] [place] [address] since your last visit? Include any pap smears or colon screening.  No

## 2018-10-05 NOTE — PROGRESS NOTES
[facility] Ms. Matthew Patel is a 67 y.o. woman with diabetes, hypertension and dyslipidemia. She has paroxysmal atrial fibrillation as noted on Holter monitor in July of 2014. Ms. Matthew Patel is here today for follow up appointment. She denies any symptoms to suggest angina or heart failure. She denies any palpitations, presyncope or syncope. Going to gym for water exercise. No new symptoms to report. Past Medical History:  
Diagnosis Date  Anxiety  Atrial fibrillation ([address] CHADS score 2  (-CHF, +HTN, -AGE, +DM, -CVA)  COPD  Degenerative spine disease  Diabetes ([address]  Dyslipidemia  Hypertension  Major depression  Menopause  Morbid [address] [address]  Neuropathic pain  Sleep apnea Past Surgical History:  
Procedure Laterality Date  HX ABDOMINOPLASTY  HX HYSTERECTOMY Current Outpatient Prescriptions Medication Sig  
 BASAGLAR KWIKPEN U-100 INSULIN 100 unit/mL (3 mL) inpn INJECT 22 UNITS UNDER THE SKIN QHS UTD.  cyanocobalamin (VITAMIN B12) 1,000 mcg/mL injection INJECT 1 ML MONTHLY  aspirin delayed-release 325 mg tablet TAKE 1 TABLET BY MOUTH EVERY DAY  carvedilol (COREG) 3.125 mg tablet TAKE 1 TABLET BY MOUTH TWICE DAILY WITH MEALS  
 HYDROcodone-acetaminophen (NORCO) 5-325 mg per tablet Take 1-2 tablets PO every 4-6 hours as needed for pain control. If over the counter ibuprofen or acetaminophen was suggested, then only take the vicodin for pain not well controlled with the over the counter medication.  SYMBICORT 160-4.5 mcg/actuation HFA inhaler Take 1 Puff by inhalation two (2) times a day.  trospium (SANCTURA XL) 60 mg capsule Take 60 mg by mouth Daily (before breakfast).  fluticasone (FLOVENT DISKUS) 50 mcg/actuation inhaler Take  by inhalation.  nystatin-triamcinolone (MYCOLOG II) topical cream Apply  to affected area two (2) times a day.  albuterol (PROAIR HFA) 90 mcg/actuation inhaler Take  by inhalation.  Omega-3-DHA-EPA-Fish Oil 1,000 (120-180) mg cap Take  by mouth.  omeprazole (PRILOSEC) 20 mg capsule Take 20 mg by mouth daily.  losartan (COZAAR) 50 mg tablet Take 50 mg by mouth daily.  buPROPion SR (WELLBUTRIN, ZYBAN) 200 mg SR tablet Take 200 mg by mouth daily.  sertraline (ZOLOFT) 100 mg tablet Take 100 mg by mouth two (2) times a day.  NIFEdipine ER (PROCARDIA XL) 60 mg ER tablet Take 60 mg by mouth daily.  tolterodine ER (DETROL LA) 4 mg ER capsule Take 4 mg by mouth daily. No current facility-administered medications for this visit. Allergies Allergen Reactions  Sulfa (Sulfonamide Antibiotics) Hives Family History:  
Non contributory for premature coronary disease in first degree relatives. Social History: She  reports that she has never smoked. She has never used smokeless tobacco.  She  reports that she does not drink alcohol. Physical:  
Vitals:  
BP: 132/62 HR: 74 Wt: 290 lb (131.5 kg) Exam:  
GENERAL:  Ms. Anselmo Henriquez is an older woman without complaints. CHEST:  Clear with good effort. CARDIAC:  Regular ABDOMEN:  Soft. Bowel sounds present. EXTREMITIES:  No edema. Scattered ecchymoses. CAROTID:  No bruit. NECK:  No JVD Review of Data:  
LABS:  
Lab Results Component Value Date/Time WBC 6.8 05/08/2017 03:40 PM  
 HGB 12.3 05/08/2017 03:40 PM  
 HCT 38.3 05/08/2017 03:40 PM  
 PLATELET 723 (L) 71/42/2831 03:40 PM  
 
Lab Results Component Value Date/Time Sodium 141 05/08/2017 03:40 PM  
 Potassium 4.5 05/08/2017 03:40 PM  
 Chloride 104 05/08/2017 03:40 PM  
 CO2 25 05/08/2017 03:40 PM  
 Glucose 118 (H) 05/08/2017 03:40 PM  
 BUN 24 (H) 05/08/2017 03:40 PM  
 Creatinine 0.91 05/08/2017 03:40 PM  
 
Lab Results Component Value Date/Time  Cholesterol, total 176 08/17/2010 02:30 PM  
 HDL Cholesterol 33 (L) 08/17/2010 02:30 PM  
 LDL, calculated 83.6 08/17/2010 02:30 PM  
 Triglyceride 297 (H) 08/17/2010 02:30 PM  
 CHOL/HDL Ratio 5.3 (H) 08/17/2010 02:30 PM  
 
No results found for: GPT No results found for: HBA1C, [id number] 
 
EKG: July 2014: 
double voltage standard. Sinus tachycardia 121 beats per minute. Nonspecific interventricular conduction delay. Nonspecific ST-T wave changes. HOLTER: July 2014: Interpretation: 1. Rhythm is sinus with intermittent brief run of atrial fibrillation. Patient was in atrial fibrillation for 0.31% of the recording time. 2. MD and QRS are within normal limits. 3. 216 PACs, 34 paired events. HOLTER (05/17) Basic rhythm is sinus with an average heart rate of 84 bpm. Range was 60 to  
124 bpm. 
Rare PVCs ( only 1 recorded in 48 hours ) Rare PACs with one 4 beat run of SVT which was felt to be atrial tachycardia. HOLTER (2017) Stanford Wise was in Sinus Rhythm. The average heart rate, excluding ectopy, was 84 BPM with a minimum of 60 BPM 
 at 08:06 D2 and a maximum of 124 BPM at  03:34 D2. Heart beats, including ectopy, totaled [id number] beats. VENTRICULAR ECTOPICS totaled 1  averaging  0.0 per hour  with 1 single, 0  
paired, 0 trigeminy and 0 R on T. 
 
SUPRAVENTRICULAR ECTOPICS totaled 215  averaging  4.7 per hour  ,with 197  
single and 14 paired beats. 
 There was 1 SUPRAVENTRICULAR TACHYCARDIA with 4 beats at 15:53 D1 at a rate  
of 110 BPM. Basic rhythm is sinus with an average heart rate of 84 bpm. Range was 60 to  
124 bpm. 
Rare PVCs ( only 1 recorded in 48 hours ) Rare PACs with one 4 beat run of SVT which was felt to be atrial tachycardia. 
  
 
ECHO:06/17 SUMMARY: 
Left ventricle: Systolic function was normal by visual assessment. Ejection fraction was estimated to be 55 %. No obvious wall motion 
abnormalities identified in the views obtained. Wall thickness was mildly 
increased.  
 
Right ventricle: Systolic function was normal. 
 
 Mitral valve: There was no evidence for stenosis. There was trivial 
regurgitation. Aortic valve: There was no stenosis. There was no regurgitation. Impression / Plan:  Diabetes  Hypertension  Dyslipidemia  Atrial fibrillation Paroxysmal atrial fibrillation: This diagnosis was made on a Holter monitor performed in July, 2014. All electrocardiograms and holter since then have shown sinus rhythm. Holter in 2017 showed sinus rhythm as well Continue coreg. Discussed about a.fib and stroke prophylaxis. ASA vs. Anticoagulation ( with coumadin / newer agent ). RIsk, benefit, side effects of medications and alternatives were discussed with patient regarding each medications. ASA for now for stroke prophylaxis. This was discussed last visit and today again. Hypertension:  Her blood pressure today is 132/62 mmHg. She's on Losartan and Nifedipine and coreg. Continue same. Diabetes: This is being managed by primary care provider. She is not on any oral agent at this time. Goal hemoglobin A1c less than 7 is recommended from cardiovascular standpoint. This is being managed by Dr. Sudhir Evans. Last A1C 6.3 Hyperlipidemia:  Currently she is taking omega 3 fatty acid. She is not on any statin medication. This is being managed by primary care provider. Defer to PCP Other than the above, or unless any additional issues arise, I have asked that she follow up in 9 months.

## 2018-12-05 RX ORDER — CARVEDILOL 3.12 MG/1
TABLET ORAL
Qty: 180 TAB | Refills: 0 | Status: SHIPPED | OUTPATIENT
Start: 2018-12-05 | End: 2018-12-26 | Stop reason: SDUPTHER

## 2018-12-19 RX ORDER — ASPIRIN 325 MG
TABLET, DELAYED RELEASE (ENTERIC COATED) ORAL
Qty: 90 TAB | Refills: 0 | Status: SHIPPED | OUTPATIENT
Start: 2018-12-19 | End: 2019-03-07 | Stop reason: SDUPTHER

## 2018-12-27 RX ORDER — CARVEDILOL 3.12 MG/1
TABLET ORAL
Qty: 180 TAB | Refills: 0 | Status: SHIPPED | OUTPATIENT
Start: 2018-12-27 | End: 2019-05-26 | Stop reason: SDUPTHER

## 2019-01-23 ENCOUNTER — HOSPITAL ENCOUNTER (OUTPATIENT)
Dept: GENERAL RADIOLOGY | Age: 74
Discharge: HOME | End: 2019-01-23
Attending: FAMILY MEDICINE
Payer: MEDICARE

## 2019-01-23 DIAGNOSIS — R52 PAIN: ICD-10-CM

## 2019-01-23 DIAGNOSIS — M25.611 DECREASED ROM OF RIGHT SHOULDER: ICD-10-CM

## 2019-01-23 PROCEDURE — 73030 X-RAY EXAM OF SHOULDER: CPT

## 2019-03-07 RX ORDER — ASPIRIN 325 MG
TABLET, DELAYED RELEASE (ENTERIC COATED) ORAL
Qty: 90 TAB | Refills: 0 | Status: SHIPPED | OUTPATIENT
Start: 2019-03-07 | End: 2019-06-19 | Stop reason: SDUPTHER

## 2019-04-19 ENCOUNTER — HOSPITAL ENCOUNTER (OUTPATIENT)
Dept: MAMMOGRAPHY | Age: 74
Discharge: HOME | End: 2019-04-19
Attending: FAMILY MEDICINE
Payer: MEDICARE

## 2019-04-19 DIAGNOSIS — Z12.31 VISIT FOR SCREENING MAMMOGRAM: ICD-10-CM

## 2019-04-19 PROCEDURE — 77063 BREAST TOMOSYNTHESIS BI: CPT

## 2019-05-28 RX ORDER — CARVEDILOL 3.12 MG/1
TABLET ORAL
Qty: 180 TAB | Refills: 3 | Status: SHIPPED | OUTPATIENT
Start: 2019-05-28 | End: 2020-04-03

## 2019-06-19 RX ORDER — ASPIRIN 325 MG
TABLET, DELAYED RELEASE (ENTERIC COATED) ORAL
Qty: 90 TAB | Refills: 0 | Status: SHIPPED | OUTPATIENT
Start: 2019-06-19 | End: 2019-09-09 | Stop reason: SDUPTHER

## 2019-09-10 RX ORDER — ASPIRIN 325 MG
TABLET, DELAYED RELEASE (ENTERIC COATED) ORAL
Qty: 90 TAB | Refills: 0 | Status: SHIPPED | OUTPATIENT
Start: 2019-09-10 | End: 2019-12-12 | Stop reason: SDUPTHER

## 2019-09-10 RX ORDER — ASPIRIN 325 MG
TABLET, DELAYED RELEASE (ENTERIC COATED) ORAL
Qty: 90 TAB | Refills: 0 | Status: SHIPPED | OUTPATIENT
Start: 2019-09-10 | End: 2020-03-17

## 2019-12-12 ENCOUNTER — OFFICE VISIT (OUTPATIENT)
Dept: CARDIOLOGY CLINIC | Age: 74
End: 2019-12-12

## 2019-12-12 VITALS
OXYGEN SATURATION: 97 % | DIASTOLIC BLOOD PRESSURE: 71 MMHG | WEIGHT: 292 LBS | SYSTOLIC BLOOD PRESSURE: 139 MMHG | BODY MASS INDEX: 58.98 KG/M2 | HEART RATE: 60 BPM

## 2019-12-12 DIAGNOSIS — I10 ESSENTIAL HYPERTENSION: Primary | ICD-10-CM

## 2019-12-12 RX ORDER — GLUCOSAMINE/CHONDR SU A SOD 167-133 MG
500 CAPSULE ORAL 2 TIMES DAILY WITH MEALS
COMMUNITY

## 2019-12-12 RX ORDER — IBUPROFEN 200 MG
TABLET ORAL
COMMUNITY
End: 2020-03-17

## 2019-12-12 RX ORDER — GABAPENTIN 300 MG/1
300 CAPSULE ORAL 3 TIMES DAILY
COMMUNITY

## 2019-12-12 NOTE — PROGRESS NOTES
[facility]       Ms. Merrill Ulloa is a 75 y.o. woman with diabetes, hypertension and dyslipidemia. She has paroxysmal atrial fibrillation as noted on Holter monitor in July of 2014. Ms. Merrill Ulloa is here today for follow up appointment. She denies any symptoms to suggest angina or heart failure. She denies any palpitations, presyncope or syncope. Goes to swimming pool for water exercise 3 times a week. No hospital admission for cardiac problems since last time    Past Medical History:   Diagnosis Date    Anxiety     Atrial fibrillation (HCC)     CHADS score 2  (-CHF, +HTN, -AGE, +DM, -CVA)    COPD     Degenerative spine disease     Diabetes ([address]     Dyslipidemia     Hypertension     Major depression     Menopause     Morbid [facility] [address] [address]     Neuropathic pain     Sleep apnea        Past Surgical History:   Procedure Laterality Date    HX ABDOMINOPLASTY      HX HYSTERECTOMY         Current Outpatient Medications   Medication Sig    nicotinic acid (NIACIN) 500 mg tablet Take 500 mg by mouth two (2) times daily (with meals).  gabapentin (NEURONTIN) 300 mg capsule Take 300 mg by mouth three (3) times daily.  ibuprofen (MOTRIN) 200 mg tablet Take  by mouth.  aspirin delayed-release 325 mg tablet TAKE 1 TABLET BY MOUTH DAILY    carvedilol (COREG) 3.125 mg tablet TAKE 1 TABLET BY MOUTH TWICE DAILY WITH MEALS    BASAGLAR KWIKPEN U-100 INSULIN 100 unit/mL (3 mL) inpn INJECT 22 UNITS UNDER THE SKIN QHS UTD.  cyanocobalamin (VITAMIN B12) 1,000 mcg/mL injection INJECT 1 ML MONTHLY    SYMBICORT 160-4.5 mcg/actuation HFA inhaler Take 1 Puff by inhalation two (2) times a day.  fluticasone (FLOVENT DISKUS) 50 mcg/actuation inhaler Take  by inhalation.  nystatin-triamcinolone (MYCOLOG II) topical cream Apply  to affected area two (2) times a day.  albuterol (PROAIR HFA) 90 mcg/actuation inhaler Take  by inhalation.     Omega-3-DHA-EPA-Fish Oil 1,000 (120-180) mg cap Take by mouth.  omeprazole (PRILOSEC) 20 mg capsule Take 20 mg by mouth daily.  losartan (COZAAR) 50 mg tablet Take 50 mg by mouth daily.  buPROPion SR (WELLBUTRIN, ZYBAN) 200 mg SR tablet Take 200 mg by mouth daily.  sertraline (ZOLOFT) 100 mg tablet Take 100 mg by mouth two (2) times a day.  NIFEdipine ER (PROCARDIA XL) 60 mg ER tablet Take 60 mg by mouth daily.  tolterodine ER (DETROL LA) 4 mg ER capsule Take 4 mg by mouth daily. No current facility-administered medications for this visit. Allergies   Allergen Reactions    Sulfa (Sulfonamide Antibiotics) Hives       Family History:   Non contributory for premature coronary disease in first degree relatives. Social History:   She  reports that she has never smoked. She has never used smokeless tobacco.  She  reports no history of alcohol use. Physical:   Vitals:   BP: 139/71  HR: 60  Wt: 292 lb (132.5 kg)    Exam:   GENERAL:  Ms. Sanjana Boone is an older woman without complaints. CHEST:  Clear with good effort. CARDIAC:  Regular  ABDOMEN:  Soft. Bowel sounds present. EXTREMITIES:  No edema. Scattered ecchymoses. CAROTID:  No bruit.   NECK:  No JVD    Review of Data:   LABS:   Lab Results   Component Value Date/Time    WBC 6.8 05/08/2017 03:40 PM    HGB 12.3 05/08/2017 03:40 PM    HCT 38.3 05/08/2017 03:40 PM    PLATELET 069 (L) 67/15/7620 03:40 PM     Lab Results   Component Value Date/Time    Sodium 141 05/08/2017 03:40 PM    Potassium 4.5 05/08/2017 03:40 PM    Chloride 104 05/08/2017 03:40 PM    CO2 25 05/08/2017 03:40 PM    Glucose 118 (H) 05/08/2017 03:40 PM    BUN 24 (H) 05/08/2017 03:40 PM    Creatinine 0.91 05/08/2017 03:40 PM     Lab Results   Component Value Date/Time    Cholesterol, total 176 08/17/2010 02:30 PM    HDL Cholesterol 33 (L) 08/17/2010 02:30 PM    LDL, calculated 83.6 08/17/2010 02:30 PM    Triglyceride 297 (H) 08/17/2010 02:30 PM    CHOL/HDL Ratio 5.3 (H) 08/17/2010 02:30 PM     No results found for: GPT  No results found for: HBA1C, [id number]    EKG: July 2014:  double voltage standard. Sinus tachycardia 121 beats per minute. Nonspecific interventricular conduction delay. Nonspecific ST-T wave changes. HOLTER: July 2014: Interpretation:  1. Rhythm is sinus with intermittent brief run of atrial fibrillation. Patient was in atrial fibrillation for 0.31% of the recording time. 2. WA and QRS are within normal limits. 3. 216 PACs, 34 paired events. HOLTER (05/17)  Basic rhythm is sinus with an average heart rate of 84 bpm. Range was 60 to   124 bpm.  Rare PVCs ( only 1 recorded in 48 hours )  Rare PACs with one 4 beat run of SVT which was felt to be atrial tachycardia. HOLTER (2017)  Juana Model was in Sinus Rhythm. The average heart rate, excluding ectopy, was 84 BPM with a minimum of 60 BPM  at 08:06 D2 and a maximum of 124 BPM at  03:34 D2. Heart beats, including ectopy, totaled [id number] beats. VENTRICULAR ECTOPICS totaled 1  averaging  0.0 per hour  with 1 single, 0 paired, 0 trigeminy and 0 R on T.    SUPRAVENTRICULAR ECTOPICS totaled 215  averaging  4.7 per hour  ,with 197   single and 14 paired beats.   There was 1 SUPRAVENTRICULAR TACHYCARDIA with 4 beats at 15:53 D1 at a rate of 110 BPM.  Basic rhythm is sinus with an average heart rate of 84 bpm. Range was 60 to 124 bpm.  Rare PVCs ( only 1 recorded in 48 hours )  Rare PACs with one 4 beat run of SVT which was felt to be atrial tachycardia.       ECHO:06/17  Left ventricle: Systolic function was normal by visual assessment. Ejection fraction was estimated to be 55 %. No obvious wall motion abnormalities identified in the views obtained. Wall thickness was mildly increased. Right ventricle: Systolic function was normal  Mitral valve: There was no evidence for stenosis. There was trivial regurgitation. Aortic valve: There was no stenosis. There was no regurgitation.     Impression / Plan:     Diabetes    Hypertension    Dyslipidemia    Atrial fibrillation     Paroxysmal atrial fibrillation: This diagnosis was made on a Holter monitor performed in July, 2014. All electrocardiograms and holter since then have shown sinus rhythm. Holter in 2017 showed sinus rhythm as well  Continue coreg. Currently on aspirin for stroke prophylaxis. On exam she is in sinus rhythm    Hypertension:  Her blood pressure today is 139/71 mmHg. She's on Losartan and Nifedipine and coreg. Continue same. Diabetes: This is being managed by primary care provider. She is not on any oral agent at this time. Goal hemoglobin A1c less than 7 is recommended from cardiovascular standpoint. This is being managed by Dr. Francesco Norwood. Last A1C 6.2    Hyperlipidemia:  Currently she is taking omega 3 fatty acid. She is not on any statin medication. This is being managed by primary care provider. Defer to PCP      Other than the above, or unless any additional issues arise, I have asked that she follow up in 9 months.

## 2019-12-12 NOTE — PROGRESS NOTES
1. Have you been to the ER, urgent care clinic since your last visit? Hospitalized since your last visit? No     2. Have you seen or consulted any other health care providers outside of the [address] [place] [address] since your last visit? Include any pap smears or colon screening.   No

## 2020-03-17 RX ORDER — ASPIRIN 325 MG
TABLET, DELAYED RELEASE (ENTERIC COATED) ORAL
Qty: 90 TAB | Refills: 0 | Status: SHIPPED | OUTPATIENT
Start: 2020-03-17 | End: 2020-06-09

## 2020-04-03 RX ORDER — CARVEDILOL 3.12 MG/1
TABLET ORAL
Qty: 180 TAB | Refills: 3 | Status: SHIPPED | OUTPATIENT
Start: 2020-04-03 | End: 2021-02-16

## 2020-04-07 ENCOUNTER — OFFICE VISIT (OUTPATIENT)
Dept: CARDIOLOGY CLINIC | Age: 75
End: 2020-04-07

## 2020-04-07 VITALS
SYSTOLIC BLOOD PRESSURE: 105 MMHG | TEMPERATURE: 97.7 F | DIASTOLIC BLOOD PRESSURE: 62 MMHG | OXYGEN SATURATION: 95 % | HEART RATE: 66 BPM

## 2020-04-07 DIAGNOSIS — I10 ESSENTIAL HYPERTENSION: Primary | ICD-10-CM

## 2020-04-07 DIAGNOSIS — E78.00 PURE HYPERCHOLESTEROLEMIA: ICD-10-CM

## 2020-04-07 DIAGNOSIS — I11.9 BENIGN HYPERTENSIVE HEART DISEASE WITHOUT HEART FAILURE: Primary | ICD-10-CM

## 2020-04-07 DIAGNOSIS — I48.0 PAF (PAROXYSMAL ATRIAL FIBRILLATION) (HCC): ICD-10-CM

## 2020-04-07 RX ORDER — HYDROCODONE BITARTRATE AND ACETAMINOPHEN 5; 325 MG/1; MG/1
TABLET ORAL
COMMUNITY

## 2020-04-07 NOTE — PROGRESS NOTES
Cardiovascular Specialists       Ms. Lyric Hirsch is a 75 y.o. woman with diabetes, hypertension and dyslipidemia. She has paroxysmal atrial fibrillation as noted on Holter monitor in July of 2014. Ms. Lyric Hirsch is here today as an add-on appointment. She was asked to come see me by primary care provider. Patient has been diagnosed with spinal stenosis especially at L4 and L5 level with lower extremity pain and back pain. She tells me that she was seen by neurosurgery team and she was told to consider pain management rather than surgical option because of comorbidity and surgical risk. She had started taking hydrocodone. Her blood pressure was low at PCP office so she was asked to come see me. She denies any presyncope or syncope. She denies any awareness of any palpitation, atrial fibrillation. Past Medical History:   Diagnosis Date    Anxiety     Atrial fibrillation (HCC)     CHADS score 2  (-CHF, +HTN, -AGE, +DM, -CVA)    COPD     Degenerative spine disease     Diabetes ([facility]     Dyslipidemia     Hypertension     Major depression     Menopause     Morbid obesity ([facility] [address]     Neuropathic pain     Sleep apnea        Past Surgical History:   Procedure Laterality Date    HX ABDOMINOPLASTY      HX HYSTERECTOMY         Current Outpatient Medications   Medication Sig    HYDROcodone-acetaminophen (NORCO) 5-325 mg per tablet Take  by mouth.  carvediloL (COREG) 3.125 mg tablet TAKE 1 TABLET BY MOUTH TWICE DAILY WITH MEALS    aspirin delayed-release 325 mg tablet TAKE 1 TABLET BY MOUTH DAILY    nicotinic acid (NIACIN) 500 mg tablet Take 500 mg by mouth two (2) times daily (with meals).  gabapentin (NEURONTIN) 300 mg capsule Take 300 mg by mouth three (3) times daily.  BASAGLAR KWIKPEN U-100 INSULIN 100 unit/mL (3 mL) inpn INJECT 22 UNITS UNDER THE SKIN QHS UTD.     cyanocobalamin (VITAMIN B12) 1,000 mcg/mL injection INJECT 1 ML MONTHLY    SYMBICORT 160-4.5 mcg/actuation HFA inhaler Take 1 Puff by inhalation two (2) times a day.  fluticasone (FLOVENT DISKUS) 50 mcg/actuation inhaler Take  by inhalation.  nystatin-triamcinolone (MYCOLOG II) topical cream Apply  to affected area two (2) times a day.  albuterol (PROAIR HFA) 90 mcg/actuation inhaler Take  by inhalation.  Omega-3-DHA-EPA-Fish Oil 1,000 (120-180) mg cap Take  by mouth.  losartan (COZAAR) 50 mg tablet Take 50 mg by mouth daily.  buPROPion SR (WELLBUTRIN, ZYBAN) 200 mg SR tablet Take 200 mg by mouth daily.  sertraline (ZOLOFT) 100 mg tablet Take 100 mg by mouth two (2) times a day.  NIFEdipine ER (PROCARDIA XL) 60 mg ER tablet Take 60 mg by mouth daily.  tolterodine ER (DETROL LA) 4 mg ER capsule Take 4 mg by mouth daily.  omeprazole (PRILOSEC) 20 mg capsule Take 20 mg by mouth daily. No current facility-administered medications for this visit. Allergies   Allergen Reactions    Sulfa (Sulfonamide Antibiotics) Hives       Family History:   Non contributory for premature coronary disease in first degree relatives. Social History:   She  reports that she has never smoked. She has never used smokeless tobacco.  She  reports no history of alcohol use. Physical:   Vitals:   BP: 105/62  HR: 66  Wt:      Exam:   GENERAL:  Ms. Fermín Pelletier is an older woman without complaints. CHEST:  Clear with good effort. CARDIAC:  Regular  ABDOMEN:  Soft. Bowel sounds present. EXTREMITIES:  No edema. Scattered ecchymoses. CAROTID:  No bruit.   NECK:  No JVD    Review of Data:   LABS:   Lab Results   Component Value Date/Time    WBC 6.8 05/08/2017 03:40 PM    HGB 12.3 05/08/2017 03:40 PM    HCT 38.3 05/08/2017 03:40 PM    PLATELET 245 (L) 28/76/3961 03:40 PM     Lab Results   Component Value Date/Time    Sodium 141 05/08/2017 03:40 PM    Potassium 4.5 05/08/2017 03:40 PM    Chloride 104 05/08/2017 03:40 PM    CO2 25 05/08/2017 03:40 PM    Glucose 118 (H) 05/08/2017 03:40 PM    BUN 24 (H) 05/08/2017 03:40 PM Creatinine 0.91 05/08/2017 03:40 PM     Lab Results   Component Value Date/Time    Cholesterol, total 176 08/17/2010 02:30 PM    HDL Cholesterol 33 (L) 08/17/2010 02:30 PM    LDL, calculated 83.6 08/17/2010 02:30 PM    Triglyceride 297 (H) 08/17/2010 02:30 PM    CHOL/HDL Ratio 5.3 (H) 08/17/2010 02:30 PM     No results found for: GPT  No results found for: HBA1C, [id number]    EKG: July 2014:  double voltage standard. Sinus tachycardia 121 beats per minute. Nonspecific interventricular conduction delay. Nonspecific ST-T wave changes. HOLTER: July 2014: Interpretation:  1. Rhythm is sinus with intermittent brief run of atrial fibrillation. Patient was in atrial fibrillation for 0.31% of the recording time. 2. NV and QRS are within normal limits. 3. 216 PACs, 34 paired events. HOLTER (05/17)  Basic rhythm is sinus with an average heart rate of 84 bpm. Range was 60 to   124 bpm.  Rare PVCs ( only 1 recorded in 48 hours )  Rare PACs with one 4 beat run of SVT which was felt to be atrial tachycardia. HOLTER (2017)  Tami Jose was in Sinus Rhythm. The average heart rate, excluding ectopy, was 84 BPM with a minimum of 60 BPM  at 08:06 D2 and a maximum of 124 BPM at  03:34 D2. Heart beats, including ectopy, totaled 311610 beats. VENTRICULAR ECTOPICS totaled 1  averaging  0.0 per hour  with 1 single, 0 paired, 0 trigeminy and 0 R on T.    SUPRAVENTRICULAR ECTOPICS totaled 215  averaging  4.7 per hour  ,with 197   single and 14 paired beats.   There was 1 SUPRAVENTRICULAR TACHYCARDIA with 4 beats at 15:53 D1 at a rate of 110 BPM.  Basic rhythm is sinus with an average heart rate of 84 bpm. Range was 60 to 124 bpm.  Rare PVCs ( only 1 recorded in 48 hours )  Rare PACs with one 4 beat run of SVT which was felt to be atrial tachycardia.       ECHO:06/17  Left ventricle: Systolic function was normal by visual assessment. Ejection fraction was estimated to be 55 %.  No obvious wall motion abnormalities identified in the views obtained. Wall thickness was mildly increased. Right ventricle: Systolic function was normal  Mitral valve: There was no evidence for stenosis. There was trivial regurgitation. Aortic valve: There was no stenosis. There was no regurgitation. Impression / Plan:     Diabetes    Hypertension    Dyslipidemia    Atrial fibrillation     Paroxysmal atrial fibrillation: This diagnosis was made on a Holter monitor performed in July, 2014. All electrocardiograms and holter since then have shown sinus rhythm. Holter in 2017 showed sinus rhythm as well  Continue coreg. Currently on aspirin for stroke prophylaxis. On exam she is in sinus rhythm    Hypertension:  Her blood pressure today is 105/62 mmHg. She's on Losartan and Nifedipine and coreg. We will reduce dose of losartan from 50 to 25 mg daily. She will check her blood pressure at home. Diabetes: This is being managed by primary care provider. She is not on any oral agent at this time. Goal hemoglobin A1c less than 7 is recommended from cardiovascular standpoint. This is being managed by Dr. Raman La. Last A1C 6.2 per patient    Hyperlipidemia:  Currently she is taking omega 3 fatty acid. She is not on any statin medication. This is being managed by primary care provider. Defer to PCP      Other than the above, or unless any additional issues arise, I have asked that she follow up in 9 months.

## 2020-04-07 NOTE — PROGRESS NOTES
1. Have you been to the ER, urgent care clinic since your last visit? Hospitalized since your last visit? No    2. Have you seen or consulted any other health care providers outside of the [address] [place] [address] since your last visit? Include any pap smears or colon screening.  No

## 2020-05-01 ENCOUNTER — TELEPHONE (OUTPATIENT)
Dept: CARDIOLOGY CLINIC | Age: 75
End: 2020-05-01

## 2020-05-01 NOTE — LETTER
[facility]5/2020 [facility] 
[address] 
[facility] [phone] [id number] To whom it may concern:  
 
[facility] was seen in our office on April 7, 2020 for cardiac evaluation. From a cardiac standpoint she is low risk for oral maxillary surgery. Please feel free to contact our office if you have any questions regarding this patient. Sincerely, Jorge Keith MD

## 2020-05-05 NOTE — TELEPHONE ENCOUNTER
Attempted to contact pt at  number, no answer. Lvm for pt to return call to office at [phone]. Will continue to try to contact pt.

## 2020-05-05 NOTE — TELEPHONE ENCOUNTER
Contacted pt at [facility] number. Two patient Identifiers confirmed. Advised pt per Dr Mary Anna. Pt verbalized understanding and stated surgeon oinfo as listed below. She stated she would need info faxed to his office but did nto have the number. She stated the phone number was [phone]   Dr Rico FIORE head and Neck.

## 2020-06-09 RX ORDER — ASPIRIN 325 MG
TABLET, DELAYED RELEASE (ENTERIC COATED) ORAL
Qty: 90 TAB | Refills: 0 | Status: SHIPPED | OUTPATIENT
Start: 2020-06-09 | End: 2020-08-30

## 2020-06-19 ENCOUNTER — HOSPITAL ENCOUNTER (OUTPATIENT)
Dept: MAMMOGRAPHY | Age: 75
Discharge: HOME | End: 2020-06-19
Attending: FAMILY MEDICINE
Payer: MEDICARE

## 2020-06-19 DIAGNOSIS — Z12.31 VISIT FOR SCREENING MAMMOGRAM: ICD-10-CM

## 2020-06-19 PROCEDURE — 77063 BREAST TOMOSYNTHESIS BI: CPT

## 2020-08-30 RX ORDER — ASPIRIN 325 MG
TABLET, DELAYED RELEASE (ENTERIC COATED) ORAL
Qty: 90 TAB | Refills: 0 | Status: SHIPPED | OUTPATIENT
Start: 2020-08-30 | End: 2020-12-14

## 2020-10-28 ENCOUNTER — TRANSCRIBE ORDER (OUTPATIENT)
Dept: SCHEDULING | Age: 75
End: 2020-10-28

## 2020-10-28 DIAGNOSIS — M85.80 OSTEOPENIA: Primary | ICD-10-CM

## 2020-10-29 ENCOUNTER — TRANSCRIBE ORDER (OUTPATIENT)
Dept: SCHEDULING | Age: 75
End: 2020-10-29

## 2020-10-29 DIAGNOSIS — M81.0 SENILE OSTEOPOROSIS: Primary | ICD-10-CM

## 2020-12-14 RX ORDER — ASPIRIN 325 MG
TABLET, DELAYED RELEASE (ENTERIC COATED) ORAL
Qty: 90 TAB | Refills: 0 | Status: SHIPPED | OUTPATIENT
Start: 2020-12-14 | End: 2021-02-17

## 2021-02-10 ENCOUNTER — OFFICE VISIT (OUTPATIENT)
Dept: CARDIOLOGY CLINIC | Age: 76
End: 2021-02-10
Payer: MEDICARE

## 2021-02-10 VITALS
DIASTOLIC BLOOD PRESSURE: 81 MMHG | OXYGEN SATURATION: 98 % | WEIGHT: 290 LBS | BODY MASS INDEX: 58.46 KG/M2 | HEART RATE: 85 BPM | SYSTOLIC BLOOD PRESSURE: 150 MMHG | RESPIRATION RATE: 18 BRPM | TEMPERATURE: 97.8 F | HEIGHT: 59 IN

## 2021-02-10 DIAGNOSIS — I10 ESSENTIAL HYPERTENSION WITH GOAL BLOOD PRESSURE LESS THAN 140/90: Primary | ICD-10-CM

## 2021-02-10 DIAGNOSIS — I48.0 PAF (PAROXYSMAL ATRIAL FIBRILLATION) (HCC): ICD-10-CM

## 2021-02-10 DIAGNOSIS — E78.00 PURE HYPERCHOLESTEROLEMIA: ICD-10-CM

## 2021-02-10 PROCEDURE — G8399 PT W/DXA RESULTS DOCUMENT: HCPCS | Performed by: INTERNAL MEDICINE

## 2021-02-10 PROCEDURE — G8417 CALC BMI ABV UP PARAM F/U: HCPCS | Performed by: INTERNAL MEDICINE

## 2021-02-10 PROCEDURE — G8428 CUR MEDS NOT DOCUMENT: HCPCS | Performed by: INTERNAL MEDICINE

## 2021-02-10 PROCEDURE — G8753 SYS BP > OR = 140: HCPCS | Performed by: INTERNAL MEDICINE

## 2021-02-10 PROCEDURE — 1090F PRES/ABSN URINE INCON ASSESS: CPT | Performed by: INTERNAL MEDICINE

## 2021-02-10 PROCEDURE — G8754 DIAS BP LESS 90: HCPCS | Performed by: INTERNAL MEDICINE

## 2021-02-10 PROCEDURE — G8536 NO DOC ELDER MAL SCRN: HCPCS | Performed by: INTERNAL MEDICINE

## 2021-02-10 PROCEDURE — 1101F PT FALLS ASSESS-DOCD LE1/YR: CPT | Performed by: INTERNAL MEDICINE

## 2021-02-10 PROCEDURE — 99213 OFFICE O/P EST LOW 20 MIN: CPT | Performed by: INTERNAL MEDICINE

## 2021-02-10 PROCEDURE — G8432 DEP SCR NOT DOC, RNG: HCPCS | Performed by: INTERNAL MEDICINE

## 2021-02-10 NOTE — LETTER
2/10/2021 Patient: Ayde Michaels YOB: 1945 Date of Visit: 2/10/2021 Fabian Gee MD 
[address] [phone] Via Fax: [phone] Dear Fabian Gee MD, Thank you for referring Ms. Ayde Michaels to [facility] AT [facility] for evaluation. My notes for this consultation are attached. If you have questions, please do not hesitate to call me. I look forward to following your patient along with you. Sincerely, Harry Yanes MD

## 2021-02-10 NOTE — PROGRESS NOTES
Cardiovascular Specialists       Ms. Vicki Garcia is a 67 y.o. woman with diabetes, hypertension and dyslipidemia. She has paroxysmal atrial fibrillation as noted on Holter monitor in July of 2014. Denies any resting or exertional chest pain or chest pressure to suggest angina or any dyspnea to suggest heart failure. No presyncope or syncope  Denies any PND or LE edema. Taking all medications regularly. Took covid 19 vaccine yesterday. Past Medical History:   Diagnosis Date    Anxiety     Atrial fibrillation (HCC)     CHADS score 2  (-CHF, +HTN, -AGE, +DM, -CVA)    COPD     Degenerative spine disease     [facility]     Dyslipidemia     Hypertension     Major depression     Menopause     Morbid obesity ([facility]     Neuropathic pain     Sleep apnea        Past Surgical History:   Procedure Laterality Date    HX ABDOMINOPLASTY      HX HYSTERECTOMY         Current Outpatient Medications   Medication Sig    aspirin delayed-release 325 mg tablet TAKE 1 TABLET BY MOUTH DAILY    HYDROcodone-acetaminophen (NORCO) 5-325 mg per tablet Take  by mouth.  carvediloL (COREG) 3.125 mg tablet TAKE 1 TABLET BY MOUTH TWICE DAILY WITH MEALS    nicotinic acid (NIACIN) 500 mg tablet Take 500 mg by mouth two (2) times daily (with meals).  gabapentin (NEURONTIN) 300 mg capsule Take 300 mg by mouth three (3) times daily.  BASAGLAR KWIKPEN U-100 INSULIN 100 unit/mL (3 mL) inpn INJECT 22 UNITS UNDER THE SKIN QHS UTD.  cyanocobalamin (VITAMIN B12) 1,000 mcg/mL injection INJECT 1 ML MONTHLY    SYMBICORT 160-4.5 mcg/actuation HFA inhaler Take 1 Puff by inhalation two (2) times a day.  fluticasone (FLOVENT DISKUS) 50 mcg/actuation inhaler Take  by inhalation.  nystatin-triamcinolone (MYCOLOG II) topical cream Apply  to affected area two (2) times a day.  albuterol (PROAIR HFA) 90 mcg/actuation inhaler Take  by inhalation.  Omega-3-DHA-EPA-Fish Oil 1,000 (120-180) mg cap Take  by mouth.     losartan (COZAAR) 50 mg tablet Take 50 mg by mouth daily.  buPROPion SR (WELLBUTRIN, ZYBAN) 200 mg SR tablet Take 200 mg by mouth daily.  sertraline (ZOLOFT) 100 mg tablet Take 100 mg by mouth two (2) times a day.  NIFEdipine ER (PROCARDIA XL) 60 mg ER tablet Take 60 mg by mouth daily.  tolterodine ER (DETROL LA) 4 mg ER capsule Take 4 mg by mouth daily.  omeprazole (PRILOSEC) 20 mg capsule Take 20 mg by mouth daily. No current facility-administered medications for this visit. Allergies   Allergen Reactions    Sulfa (Sulfonamide Antibiotics) Hives       Family History:   Non contributory for premature coronary disease in first degree relatives. Social History:   She  reports that she has never smoked. She has never used smokeless tobacco.  She  reports no history of alcohol use. Physical:   Vitals:   BP: (!) 150/81  HR: 85  Wt: 290 lb (131.5 kg)    Exam:   GENERAL:  Ms. Brandi Evans is an older woman without complaints. CHEST:  Clear with good effort. CARDIAC:  Regular rhythm. No obvious murmur  ABDOMEN:  Soft. Bowel sounds present. EXTREMITIES:  No edema. Scattered ecchymoses. CAROTID:  No bruit.   NECK:  No JVD    Review of Data:   LABS:   Lab Results   Component Value Date/Time    WBC 6.8 05/08/2017 03:40 PM    HGB 12.3 05/08/2017 03:40 PM    HCT 38.3 05/08/2017 03:40 PM    PLATELET 567 (L) 21/98/4003 03:40 PM     Lab Results   Component Value Date/Time    Sodium 141 05/08/2017 03:40 PM    Potassium 4.5 05/08/2017 03:40 PM    Chloride 104 05/08/2017 03:40 PM    CO2 25 05/08/2017 03:40 PM    Glucose 118 (H) 05/08/2017 03:40 PM    BUN 24 (H) 05/08/2017 03:40 PM    Creatinine 0.91 05/08/2017 03:40 PM     Lab Results   Component Value Date/Time    Cholesterol, total 176 08/17/2010 02:30 PM    HDL Cholesterol 33 (L) 08/17/2010 02:30 PM    LDL, calculated 83.6 08/17/2010 02:30 PM    Triglyceride 297 (H) 08/17/2010 02:30 PM    CHOL/HDL Ratio 5.3 (H) 08/17/2010 02:30 PM     No components found for: GPT  No results found for: HBA1C, TDY2LYER    EKG: July 2014:  double voltage standard. Sinus tachycardia 121 beats per minute. Nonspecific interventricular conduction delay. Nonspecific ST-T wave changes. HOLTER: July 2014: Interpretation:  1. Rhythm is sinus with intermittent brief run of atrial fibrillation. Patient was in atrial fibrillation for 0.31% of the recording time. 2. AL and QRS are within normal limits. 3. 216 PACs, 34 paired events. HOLTER (05/17)  Basic rhythm is sinus with an average heart rate of 84 bpm. Range was 60 to   124 bpm.  Rare PVCs ( only 1 recorded in 48 hours )  Rare PACs with one 4 beat run of SVT which was felt to be atrial tachycardia. HOLTER (2017)  Ángela Montelongo was in Sinus Rhythm. The average heart rate, excluding ectopy, was 84 BPM with a minimum of 60 BPM  at 08:06 D2 and a maximum of 124 BPM at  03:34 D2. Heart beats, including ectopy, totaled [id number] beats. VENTRICULAR ECTOPICS totaled 1  averaging  0.0 per hour  with 1 single, 0 paired, 0 trigeminy and 0 R on T.    SUPRAVENTRICULAR ECTOPICS totaled 215  averaging  4.7 per hour  ,with 197   single and 14 paired beats.   There was 1 SUPRAVENTRICULAR TACHYCARDIA with 4 beats at 15:53 D1 at a rate of 110 BPM.  Basic rhythm is sinus with an average heart rate of 84 bpm. Range was 60 to 124 bpm.  Rare PVCs ( only 1 recorded in 48 hours )  Rare PACs with one 4 beat run of SVT which was felt to be atrial tachycardia.       ECHO:06/17  Left ventricle: Systolic function was normal by visual assessment. Ejection fraction was estimated to be 55 %. No obvious wall motion abnormalities identified in the views obtained. Wall thickness was mildly increased. Right ventricle: Systolic function was normal  Mitral valve: There was no evidence for stenosis. There was trivial regurgitation. Aortic valve: There was no stenosis. There was no regurgitation.     Impression / Plan:     Diabetes    Hypertension   • Dyslipidemia   • Atrial fibrillation     Paroxysmal atrial fibrillation:    This diagnosis was made on a Holter monitor performed in July, 2014.  Holter in 2017 showed sinus rhythm as well  Continue coreg.   Currently on aspirin for stroke prophylaxis.  On exam she is in sinus rhythm    Hypertension:  Her blood pressure today is 150/80 mmHg.  She is on Coreg, losartan, nifedipine.  Continue same medication    Diabetes:  This is being managed by primary care provider.  Goal hemoglobin A1c less than 7 is recommended from cardiovascular standpoint.  This is being managed by Dr. Hoffman. Last A1C 6.2 per patient    Hyperlipidemia:  Currently she is taking omega 3 fatty acid.  She is not on any statin medication.  This is being managed by primary care provider. Defer to PCP      Other than the above, or unless any additional issues arise, I have asked that she follow up in 9 months.

## 2021-02-10 NOTE — PROGRESS NOTES
Mercedes Canchola presents today for   Chief Complaint   Patient presents with    Follow-up     1 year       Mercedes Canchola preferred language for health care discussion is english/other. Personal Protective Equipment:   Personal Protective Equipment was used including: mask-surgical and hands-gloves. Patient was placed on no precaution(s). Patient was masked. Is someone accompanying this pt? No    Is the patient using any DME equipment during OV? Yes; Rollator    Depression Screening:  3 most recent PHQ Screens 4/7/2020   Little interest or pleasure in doing things Not at all   Feeling down, depressed, irritable, or hopeless Not at all   Total Score PHQ 2 0       Learning Assessment:  Learning Assessment 5/23/2017   PRIMARY LEARNER Patient   PRIMARY LANGUAGE ENGLISH   LEARNER PREFERENCE PRIMARY DEMONSTRATION   ANSWERED BY pt   RELATIONSHIP SELF       Abuse Screening:  No flowsheet data found. Fall Risk  Fall Risk Assessment, last 12 mths 4/7/2020   Able to walk? Yes   Fall in past 12 months? No       Pt currently taking Anticoagulant therapy? No    Coordination of Care:  1. Have you been to the ER, urgent care clinic since your last visit? Hospitalized since your last visit? No    2. Have you seen or consulted any other health care providers outside of the [address] [place] [address] since your last visit? Include any pap smears or colon screening.  No

## 2021-02-16 RX ORDER — CARVEDILOL 3.12 MG/1
TABLET ORAL
Qty: 180 TAB | Refills: 3 | Status: SHIPPED | OUTPATIENT
Start: 2021-02-16

## 2021-02-17 RX ORDER — ASPIRIN 325 MG
TABLET, DELAYED RELEASE (ENTERIC COATED) ORAL
Qty: 90 TAB | Refills: 0 | Status: SHIPPED | OUTPATIENT
Start: 2021-02-17 | End: 2021-06-15

## 2021-06-15 RX ORDER — ASPIRIN 325 MG
TABLET, DELAYED RELEASE (ENTERIC COATED) ORAL
Qty: 90 TABLET | Refills: 0 | Status: SHIPPED | OUTPATIENT
Start: 2021-06-15

## 2021-06-25 ENCOUNTER — HOSPITAL ENCOUNTER (OUTPATIENT)
Dept: WOMENS IMAGING | Age: 76
Discharge: HOME | End: 2021-06-25
Payer: MEDICARE

## 2021-06-25 DIAGNOSIS — Z12.31 ENCOUNTER FOR SCREENING MAMMOGRAM FOR BREAST CANCER: ICD-10-CM

## 2021-06-25 PROCEDURE — 77063 BREAST TOMOSYNTHESIS BI: CPT
